# Patient Record
Sex: MALE | Race: WHITE | Employment: FULL TIME | URBAN - METROPOLITAN AREA
[De-identification: names, ages, dates, MRNs, and addresses within clinical notes are randomized per-mention and may not be internally consistent; named-entity substitution may affect disease eponyms.]

---

## 2019-11-11 LAB
HBA1C MFR BLD HPLC: 8.7 %
LDL-C, EXTERNAL: 64

## 2020-08-05 ENCOUNTER — TELEPHONE (OUTPATIENT)
Dept: FAMILY MEDICINE CLINIC | Age: 50
End: 2020-08-05

## 2020-08-05 DIAGNOSIS — E11.65 TYPE 2 DIABETES MELLITUS WITH HYPERGLYCEMIA, WITHOUT LONG-TERM CURRENT USE OF INSULIN (HCC): Primary | ICD-10-CM

## 2020-08-05 RX ORDER — DULAGLUTIDE 1.5 MG/.5ML
1.5 INJECTION, SOLUTION SUBCUTANEOUS
Qty: 2 ML | Refills: 0 | Status: SHIPPED | OUTPATIENT
Start: 2020-08-05 | End: 2020-08-07 | Stop reason: SDUPTHER

## 2020-08-07 ENCOUNTER — VIRTUAL VISIT (OUTPATIENT)
Dept: FAMILY MEDICINE CLINIC | Age: 50
End: 2020-08-07
Payer: COMMERCIAL

## 2020-08-07 DIAGNOSIS — I10 ESSENTIAL HYPERTENSION: ICD-10-CM

## 2020-08-07 DIAGNOSIS — E55.9 VITAMIN D DEFICIENCY: ICD-10-CM

## 2020-08-07 DIAGNOSIS — E11.65 CONTROLLED TYPE 2 DIABETES MELLITUS WITH HYPERGLYCEMIA, WITHOUT LONG-TERM CURRENT USE OF INSULIN (HCC): Primary | ICD-10-CM

## 2020-08-07 DIAGNOSIS — E11.65 TYPE 2 DIABETES MELLITUS WITH HYPERGLYCEMIA, WITHOUT LONG-TERM CURRENT USE OF INSULIN (HCC): ICD-10-CM

## 2020-08-07 DIAGNOSIS — E78.00 HIGH CHOLESTEROL: ICD-10-CM

## 2020-08-07 PROBLEM — E11.9 DIABETES MELLITUS TYPE 2, CONTROLLED (HCC): Status: ACTIVE | Noted: 2020-08-07

## 2020-08-07 PROCEDURE — 99213 OFFICE O/P EST LOW 20 MIN: CPT | Performed by: NURSE PRACTITIONER

## 2020-08-07 RX ORDER — METFORMIN HYDROCHLORIDE 500 MG/1
TABLET, EXTENDED RELEASE ORAL
Qty: 360 TAB | Refills: 0 | Status: SHIPPED | OUTPATIENT
Start: 2020-08-07 | End: 2020-11-03

## 2020-08-07 RX ORDER — LISINOPRIL 20 MG/1
20 TABLET ORAL DAILY
Qty: 90 TAB | Refills: 0 | Status: SHIPPED | OUTPATIENT
Start: 2020-08-07 | End: 2021-01-13

## 2020-08-07 RX ORDER — ROSUVASTATIN CALCIUM 10 MG/1
10 TABLET, COATED ORAL
Qty: 90 TAB | Refills: 0 | Status: SHIPPED | OUTPATIENT
Start: 2020-08-07 | End: 2020-11-03

## 2020-08-07 RX ORDER — BLOOD SUGAR DIAGNOSTIC
STRIP MISCELLANEOUS
COMMUNITY
Start: 2020-05-13

## 2020-08-07 RX ORDER — LISINOPRIL 20 MG/1
TABLET ORAL
COMMUNITY
Start: 2020-08-05 | End: 2020-08-07 | Stop reason: SDUPTHER

## 2020-08-07 RX ORDER — METFORMIN HYDROCHLORIDE 500 MG/1
TABLET, EXTENDED RELEASE ORAL
COMMUNITY
Start: 2020-05-13 | End: 2020-08-07 | Stop reason: SDUPTHER

## 2020-08-07 RX ORDER — DULAGLUTIDE 1.5 MG/.5ML
1.5 INJECTION, SOLUTION SUBCUTANEOUS
Qty: 6 ML | Refills: 0 | Status: SHIPPED | OUTPATIENT
Start: 2020-08-07 | End: 2020-08-31

## 2020-08-07 RX ORDER — LANCETS
EACH MISCELLANEOUS
COMMUNITY
Start: 2020-05-13

## 2020-08-07 RX ORDER — ACETAMINOPHEN 500 MG
2000 TABLET ORAL 2 TIMES DAILY
Qty: 90 CAP | Refills: 3 | Status: SHIPPED | OUTPATIENT
Start: 2020-08-07 | End: 2021-01-03

## 2020-08-07 RX ORDER — ROSUVASTATIN CALCIUM 10 MG/1
TABLET, COATED ORAL
COMMUNITY
Start: 2020-05-11 | End: 2020-08-07 | Stop reason: SDUPTHER

## 2020-08-07 NOTE — PROGRESS NOTES
Consent: Dina Loera, who was seen by synchronous (real-time) audio-video technology, and/or his healthcare decision maker, is aware that this patient-initiated, Telehealth encounter on 8/7/2020 is a billable service, with coverage as determined by his insurance carrier. He is aware that he may receive a bill and has provided verbal consent to proceed: YES-Consent obtained within past 12 months        712  Subjective:   Dina Loera is a 53 y.o. male who was seen for Diabetes  Patient presents for management of type 2 diabetes and HTN. He was last seen 2/12/2020, A1c at the time of visit was 9.3%. Medications reviewed, taking as prescribed with no known side effects. Checks BP readings once a month, reported home BP readings 130-132/80-84. Denies CP, SOB, palpitations, headedness, and dizziness. Does not exercise regularly, stays hydrated, and limits salt. Motivated to start exercise and lose weight when doctorate classes are completed. Discussing weight loss surgery with Dr. Marilee Byrd. Does not check home glucose readings. Ran out of Trulicity for one month and recently restarted med. Declines A1c check until he has been on Trulicity for 3 months. Reports 7-8 pound weight loss since February. Avoids sugar and does not limit carbs. Denies hypoglycemia, frequent urination, thirst, and hunger. Does not have annual exam, denies blurred vision. Checks feet regularly, denies numbness and calluses. Prior to Admission medications    Medication Sig Start Date End Date Taking? Authorizing Provider   dulaglutide (Trulicity) 1.5 LY/3.9 mL sub-q pen 0.5 mL by SubCUTAneous route every seven (7) days. 8/7/20  Yes Wilber Loop, NP   lisinopriL (PRINIVIL, ZESTRIL) 20 mg tablet Take 1 Tab by mouth daily. 8/7/20  Yes Wilber Loop, NP   metFORMIN ER (GLUCOPHAGE XR) 500 mg tablet Take 2 tablets twice daily with meals. 8/7/20  Yes Wilber Loop, NP   rosuvastatin (CRESTOR) 10 mg tablet Take 1 Tab by mouth nightly. 8/7/20  Yes Milo Rm NP   cholecalciferol (VITAMIN D3) (2,000 UNITS /50 MCG) cap capsule Take 2,000 Units by mouth two (2) times a day. 8/7/20  Yes Milo Rm NP   OneTouch UltraSoft Lancets misc  5/13/20   Provider, Historical   OneTouch Verio test strips strip  5/13/20   Provider, Historical   lisinopriL (PRINIVIL, ZESTRIL) 20 mg tablet  8/5/20 8/7/20  Provider, Historical   metFORMIN ER (GLUCOPHAGE XR) 500 mg tablet  5/13/20 8/7/20  Provider, Historical   rosuvastatin (CRESTOR) 10 mg tablet  5/11/20 8/7/20  Provider, Historical   dulaglutide (Trulicity) 1.5 KH/0.8 mL sub-q pen 0.5 mL by SubCUTAneous route every seven (7) days. 8/5/20 8/7/20  Milo Rm NP     No Known Allergies  Patient Active Problem List    Diagnosis    Diabetes mellitus type 2, controlled ([facility] [address]    High cholesterol    Hypertension    Vitamin D deficiency    Morbid obesity ([facility] [address]         ROS  See HPI for pertinent ROS. Objective:   Vital Signs: (As obtained by patient/caregiver at home)  There were no vitals taken for this visit.      [INSTRUCTIONS:  \"[x]\" Indicates a positive item  \"[]\" Indicates a negative item  -- DELETE ALL ITEMS NOT EXAMINED]    Constitutional: [x] Appears well-developed and well-nourished [x] No apparent distress      [] Abnormal -     Mental status: [x] Alert and awake  [x] Oriented to person/place/time [x] Able to follow commands    [] Abnormal -     Eyes:   EOM    [x]  Normal    [] Abnormal -   Sclera  [x]  Normal    [] Abnormal -          Discharge [x]  None visible   [] Abnormal -     HENT: [x] Normocephalic, atraumatic  [] Abnormal -   [x] Mouth/Throat: Mucous membranes are moist    External Ears [x] Normal  [] Abnormal -    Neck: [x] No visualized mass [] Abnormal -     Pulmonary/Chest: [x] Respiratory effort normal   [x] No visualized signs of difficulty breathing or respiratory distress        [] Abnormal -        Neurological:        [x] No Facial Asymmetry (Cranial nerve 7 motor function) (limited exam due to video visit)          [x] No gaze palsy        [] Abnormal -          Skin:        [x] No significant exanthematous lesions or discoloration noted on facial skin         [] Abnormal -            Psychiatric:       [x] Normal Affect [] Abnormal -        [x] No Hallucinations    Other pertinent observable physical exam findings:-              Assessment & Plan:   Diagnoses and all orders for this visit:    1. Controlled type 2 diabetes mellitus with hyperglycemia, without long-term current use of insulin ([facility])  Declines A1c check at this time since he has been out of Trulicity for a month. I am agreeable to this but stressed the importance of home glucose monitoring with goal of less than 125 fasting and 180 postprandial.  Praised for motivation to lose weight which will have a positive impact on diabetes. -     dulaglutide (Trulicity) 1.5 QC/7.9 mL sub-q pen; 0.5 mL by SubCUTAneous route every seven (7) days. -     metFORMIN ER (GLUCOPHAGE XR) 500 mg tablet; Take 2 tablets twice daily with meals. 2. Essential hypertension  Reported home BP readings are below goal.  No medication changes. Continue to check readings regularly and call if consistently greater than 140/90 on either number.  -     lisinopriL (PRINIVIL, ZESTRIL) 20 mg tablet; Take 1 Tab by mouth daily. 3. Type 2 diabetes mellitus with hyperglycemia, without long-term current use of insulin ([facility]  Duplicate diagnosis addressed above. -     dulaglutide (Trulicity) 1.5 DH/4.3 mL sub-q pen; 0.5 mL by SubCUTAneous route every seven (7) days. 4. High cholesterol  Medication refilled as requested. Due for annual labs 11/20/2020.  -     rosuvastatin (CRESTOR) 10 mg tablet; Take 1 Tab by mouth nightly. 5. Vitamin D deficiency  Medication refilled as requested. Due for annual labs 11/20/2020.  -     cholecalciferol (VITAMIN D3) (2,000 UNITS /50 MCG) cap capsule;  Take 2,000 Units by mouth two (2) times a day.          Follow-up and Dispositions    · Return in about 3 months (around 11/7/2020) for wellness, fasting labs, follow up, T2DM, hypertension, lipids, weight. We discussed the expected course, resolution and complications of the diagnosis(es) in detail. Medication risks, benefits, costs, interactions, and alternatives were discussed as indicated. I advised him to contact the office if his condition worsens, changes or fails to improve as anticipated. He expressed understanding with the diagnosis(es) and plan. Ena Ruvalcaba is a 53 y.o. male being evaluated by a video visit encounter for concerns as above. A caregiver was present when appropriate. Due to this being a TeleHealth encounter (During [place] [id number] public [facility] emergency), evaluation of the following organ systems was limited: Vitals/Constitutional/EENT/Resp/CV/GI//MS/Neuro/Skin/Heme-Lymph-Imm. Pursuant to the emergency declaration under the [facility], [facility] waiver authority and the [employer] Act, this Virtual  Visit was conducted, with patient's (and/or legal guardian's) consent, to reduce the patient's risk of exposure to COVID-19 and provide necessary medical care. Services were provided through a video synchronous discussion virtually to substitute for in-person clinic visit. Patient and provider were located at their individual homes.         Mitali Wilkinson NP

## 2020-08-07 NOTE — PATIENT INSTRUCTIONS
Learning About Diabetes and Exercise Can you exercise if you have diabetes? When you have diabetes, it's important to get regular exercise. This helps control your blood sugar level. You can still play sports, run, ride a bike, go swimming, and do other activities when you have diabetes. How can exercise help you manage diabetes? Your body turns the food you eat into glucose, a type of sugar. You need this sugar for energy. When you have diabetes, the sugar builds up in your blood. But when you exercise, your body uses sugar. This helps keep it from building up in your blood and results in lower blood sugar and better control of diabetes. Exercise may help you in other ways too. It can help you reach and stay at a healthy weight. It also helps improve blood pressure and cholesterol, which can reduce the risk of heart disease. Exercise can make you feel stronger and happier. It can help you relax and sleep better, and give you confidence in other things you do. How can you exercise safely? Before you start a new exercise program, talk to your doctor about how and when to exercise. You may need to have a medical exam and tests before you begin. Some types of exercise can be harmful if your diabetes is causing other problems, such as problems with your feet. Your doctor can tell you what types of exercise are good choices for you. These tips can help you exercise safely when you have diabetes. If your diabetes is controlled by diet or medicine that doesn't lower your blood sugar, you don't need to eat a snack before you exercise. · Check your blood sugar before you exercise. And be careful about what you eat. ? If your blood sugar is less than 100, eat a carbohydrate snack before you exercise. ? Be careful when you exercise if your blood sugar is over 300. High blood sugar can make you dehydrated. And that makes your blood sugar levels go even higher. If you have ketones in your blood or urine and your blood sugar is over 300, do not exercise. · Don't try to do too much at first. Build up your exercise program bit by bit. Try to get at least 30 minutes of exercise on most days of the week. Walking is a good choice. You also may want to do other activities, such as riding a bike or swimming. You might try running or gardening. Try to include muscle-strengthening exercises at least 2 times a week. These exercises include push-ups and weight training. You can also use rubber tubing or stretch bands. You stretch or pull the tubing or band to build muscle strength. If you want to exercise more, slowly increase how hard or long you exercise. · You may get symptoms of low blood sugar during exercise or up to 24 hours later. Some symptoms of low blood sugar, such as sweating, a fast heartbeat, or feeling tired, can be confused with what can happen anytime you exercise. Other symptoms may include feeling anxious, dizzy, weak, or shaky. So it's a good idea to check your blood sugar again. · You can treat low blood sugar by eating or drinking something that has 15 grams of carbohydrate. These should be quick-sugar foods. Quick-sugar foods such as glucose tablets, table sugar, honey, fruit juice, regular (not diet) soda pop, or hard candy can help raise blood sugar. Check your blood sugar level again 15 minutes after having a quick-sugar food to make sure your level is getting back to your target range. · Drink plenty of water before, during, and after you exercise. · Wear medical alert jewelry that says you have diabetes. You can buy this at most drugstores. · Pay attention to your body. If you are used to exercise and notice that you can't do as much as usual, talk to your doctor. Follow-up care is a key part of your treatment and safety. Be sure to make and go to all appointments, and call your doctor if you are having problems. It's also a good idea to know your test results and keep a list of the medicines you take. Where can you learn more? Go to http://tesha-clive.info/ Enter E371 in the search box to learn more about \"Learning About Diabetes and Exercise. \" Current as of: December 20, 2019               Content Version: 12.5 © 8923-8205 [employer]. Care instructions adapted under license by [employer] (which disclaims liability or warranty for this information). If you have questions about a medical condition or this instruction, always ask your healthcare professional. [address] any warranty or liability for your use of this information. Learning About Diabetes Food Guidelines Your Care Instructions Meal planning is important to manage diabetes. It helps keep your blood sugar at a target level (which you set with your doctor). You don't have to eat special foods. You can eat what your family eats, including sweets once in a while. But you do have to pay attention to how often you eat and how much you eat of certain foods. You may want to work with a dietitian or a certified diabetes educator (CDE) to help you plan meals and snacks. A dietitian or CDE can also help you lose weight if that is one of your goals. What should you know about eating carbs? Managing the amount of carbohydrate (carbs) you eat is an important part of healthy meals when you have diabetes. Carbohydrate is found in many foods. · Learn which foods have carbs. And learn the amounts of carbs in different foods. ? Bread, cereal, pasta, and rice have about 15 grams of carbs in a serving. A serving is 1 slice of bread (1 ounce), ½ cup of cooked cereal, or 1/3 cup of cooked pasta or rice. ? Fruits have 15 grams of carbs in a serving. A serving is 1 small fresh fruit, such as an apple or orange; ½ of a banana; ½ cup of cooked or canned fruit; ½ cup of fruit juice; 1 cup of melon or raspberries; or 2 tablespoons of dried fruit. ? Milk and no-sugar-added yogurt have 15 grams of carbs in a serving. A serving is 1 cup of milk or 2/3 cup of no-sugar-added yogurt. ? Starchy vegetables have 15 grams of carbs in a serving. A serving is ½ cup of mashed potatoes or sweet potato; 1 cup winter squash; ½ of a small baked potato; ½ cup of cooked beans; or ½ cup cooked corn or green peas. · Learn how much carbs to eat each day and at each meal. A dietitian or CDE can teach you how to keep track of the amount of carbs you eat. This is called carbohydrate counting. · If you are not sure how to count carbohydrate grams, use the Plate Method to plan meals. It is a good, quick way to make sure that you have a balanced meal. It also helps you spread carbs throughout the day. ? Divide your plate by types of foods. Put non-starchy vegetables on half the plate, meat or other protein food on one-quarter of the plate, and a grain or starchy vegetable in the final quarter of the plate. To this you can add a small piece of fruit and 1 cup of milk or yogurt, depending on how many carbs you are supposed to eat at a meal. 
· Try to eat about the same amount of carbs at each meal. Do not \"save up\" your daily allowance of carbs to eat at one meal. 
· Proteins have very little or no carbs per serving. Examples of proteins are beef, chicken, turkey, fish, eggs, tofu, cheese, cottage cheese, and peanut butter. A serving size of meat is 3 ounces, which is about the size of a deck of cards. Examples of meat substitute serving sizes (equal to 1 ounce of meat) are 1/4 cup of cottage cheese, 1 egg, 1 tablespoon of peanut butter, and ½ cup of tofu. How can you eat out and still eat healthy? · Learn to estimate the serving sizes of foods that have carbohydrate. If you measure food at home, it will be easier to estimate the amount in a serving of restaurant food. · If the meal you order has too much carbohydrate (such as potatoes, corn, or baked beans), ask to have a low-carbohydrate food instead. Ask for a salad or green vegetables. · If you use insulin, check your blood sugar before and after eating out to help you plan how much to eat in the future. · If you eat more carbohydrate at a meal than you had planned, take a walk or do other exercise. This will help lower your blood sugar. What else should you know? · Limit saturated fat, such as the fat from meat and dairy products. This is a healthy choice because people who have diabetes are at higher risk of heart disease. So choose lean cuts of meat and nonfat or low-fat dairy products. Use olive or canola oil instead of butter or shortening when cooking. · Don't skip meals. Your blood sugar may drop too low if you skip meals and take insulin or certain medicines for diabetes. · Check with your doctor before you drink alcohol. Alcohol can cause your blood sugar to drop too low. Alcohol can also cause a bad reaction if you take certain diabetes medicines. Follow-up care is a key part of your treatment and safety. Be sure to make and go to all appointments, and call your doctor if you are having problems. It's also a good idea to know your test results and keep a list of the medicines you take. Where can you learn more? Go to http://tesha-clive.info/ Enter Q452 in the search box to learn more about \"Learning About Diabetes Food Guidelines. \" Current as of: December 20, 2019               Content Version: 12.5 © 2136-9061 [employer]. Care instructions adapted under license by [employer] (which disclaims liability or warranty for this information). If you have questions about a medical condition or this instruction, always ask your healthcare professional. [address] any warranty or liability for your use of this information. Learning About High Blood Pressure What is high blood pressure? Blood pressure is a measure of how hard the blood pushes against the walls of your arteries. It's normal for blood pressure to go up and down throughout the day. But if it stays up, you have high blood pressure. Another name for high blood pressure is hypertension. Two numbers tell you your blood pressure. The first number is the systolic pressure (top number). It shows how hard the blood pushes when your heart is pumping. The second number is the diastolic pressure (bottom number). It shows how hard the blood pushes between heartbeats, when your heart is relaxed and filling with blood. Your doctor will give you a goal for your blood pressure based on your health and your age. High blood pressure (hypertension) means that the top number stays high, or the bottom number stays high, or both. High blood pressure increases the risk of stroke, heart attack, and other problems. What happens when you have high blood pressure? · Blood flows through your arteries with too much force. Over time, this damages the walls of your arteries. But you can't feel it. High blood pressure usually doesn't cause symptoms. · Fat and calcium start to build up in your arteries. This buildup is called plaque. Plaque makes your arteries narrower and stiffer. Blood can't flow through them as easily. · This lack of good blood flow starts to damage some of the organs in your body. This can lead to problems such as coronary artery disease and heart attack, heart failure, stroke, kidney failure, and eye damage. How can you prevent high blood pressure? · Stay at a healthy weight. · Try to limit how much sodium you eat to less than 2,300 milligrams (mg) a day. If you limit your sodium to 1,500 mg a day, you can lower your blood pressure even more. ? Buy foods that are labeled \"unsalted,\" \"sodium-free,\" or \"low-sodium. \" Foods labeled \"reduced-sodium\" and \"light sodium\" may still have too much sodium. ? Flavor your food with garlic, lemon juice, onion, vinegar, herbs, and spices instead of salt. Do not use soy sauce, steak sauce, onion salt, garlic salt, mustard, or ketchup on your food. ? Use less salt (or none) when recipes call for it. You can often use half the salt a recipe calls for without losing flavor. · Be physically active. Get at least 30 minutes of exercise on most days of the week. Walking is a good choice. You also may want to do other activities, such as running, swimming, cycling, or playing tennis or team sports. · Limit alcohol to 2 drinks a day for men and 1 drink a day for women. · Eat plenty of fruits, vegetables, and low-fat dairy products. Eat less saturated and total fats. How is high blood pressure treated? · Your doctor will suggest making lifestyle changes to help your heart. For example, your doctor may ask you to eat healthy foods, quit smoking, lose extra weight, and be more active. · If lifestyle changes don't help enough, your doctor may recommend that you take medicine. · When blood pressure is very high, medicines are needed to lower it. Follow-up care is a key part of your treatment and safety. Be sure to make and go to all appointments, and call your doctor if you are having problems. It's also a good idea to know your test results and keep a list of the medicines you take. Where can you learn more? Go to http://tesha-clive.info/ Enter P501 in the search box to learn more about \"Learning About High Blood Pressure. \" 
 Current as of: December 16, 2019               Content Version: 12.5 © 8371-3206 [employer]. Care instructions adapted under license by [employer] (which disclaims liability or warranty for this information). If you have questions about a medical condition or this instruction, always ask your healthcare professional. [address] any warranty or liability for your use of this information.

## 2020-09-09 VITALS
SYSTOLIC BLOOD PRESSURE: 120 MMHG | HEIGHT: 74 IN | BODY MASS INDEX: 40.43 KG/M2 | HEART RATE: 94 BPM | DIASTOLIC BLOOD PRESSURE: 78 MMHG | OXYGEN SATURATION: 95 % | WEIGHT: 315 LBS | TEMPERATURE: 97.6 F

## 2020-09-09 PROBLEM — E78.5 HYPERLIPIDEMIA: Status: ACTIVE | Noted: 2020-08-07

## 2020-09-09 RX ORDER — CHOLECALCIFEROL (VITAMIN D3) 50 MCG
CAPSULE ORAL
COMMUNITY

## 2020-10-31 DIAGNOSIS — E11.65 CONTROLLED TYPE 2 DIABETES MELLITUS WITH HYPERGLYCEMIA, WITHOUT LONG-TERM CURRENT USE OF INSULIN (HCC): ICD-10-CM

## 2020-10-31 DIAGNOSIS — E78.00 HIGH CHOLESTEROL: ICD-10-CM

## 2020-11-03 RX ORDER — METFORMIN HYDROCHLORIDE 500 MG/1
TABLET, EXTENDED RELEASE ORAL
Qty: 360 TAB | Refills: 0 | Status: SHIPPED | OUTPATIENT
Start: 2020-11-03 | End: 2021-01-28

## 2020-11-03 RX ORDER — ROSUVASTATIN CALCIUM 10 MG/1
TABLET, COATED ORAL
Qty: 90 TAB | Refills: 0 | Status: SHIPPED | OUTPATIENT
Start: 2020-11-03 | End: 2021-01-28

## 2020-11-12 ENCOUNTER — OFFICE VISIT (OUTPATIENT)
Dept: FAMILY MEDICINE CLINIC | Age: 50
End: 2020-11-12
Payer: COMMERCIAL

## 2020-11-12 VITALS
TEMPERATURE: 96 F | SYSTOLIC BLOOD PRESSURE: 132 MMHG | WEIGHT: 315 LBS | BODY MASS INDEX: 40.43 KG/M2 | DIASTOLIC BLOOD PRESSURE: 82 MMHG | HEART RATE: 81 BPM | HEIGHT: 74 IN | OXYGEN SATURATION: 96 %

## 2020-11-12 DIAGNOSIS — I10 ESSENTIAL HYPERTENSION: Primary | ICD-10-CM

## 2020-11-12 DIAGNOSIS — E78.00 PURE HYPERCHOLESTEROLEMIA: ICD-10-CM

## 2020-11-12 DIAGNOSIS — E11.65 CONTROLLED TYPE 2 DIABETES MELLITUS WITH HYPERGLYCEMIA, WITHOUT LONG-TERM CURRENT USE OF INSULIN (HCC): ICD-10-CM

## 2020-11-12 DIAGNOSIS — Z28.21 INFLUENZA VACCINE REFUSED: ICD-10-CM

## 2020-11-12 PROCEDURE — 99214 OFFICE O/P EST MOD 30 MIN: CPT | Performed by: NURSE PRACTITIONER

## 2020-11-12 RX ORDER — BISMUTH SUBSALICYLATE 262 MG
1 TABLET,CHEWABLE ORAL DAILY
COMMUNITY

## 2020-11-12 NOTE — PATIENT INSTRUCTIONS
Learning About Diabetes and Exercise Can you exercise if you have diabetes? When you have diabetes, it's important to get regular exercise. This helps control your blood sugar level. You can still play sports, run, ride a bike, go swimming, and do other activities when you have diabetes. How can exercise help you manage diabetes? Your body turns the food you eat into glucose, a type of sugar. You need this sugar for energy. When you have diabetes, the sugar builds up in your blood. But when you exercise, your body uses sugar. This helps keep it from building up in your blood and results in lower blood sugar and better control of diabetes. Exercise may help you in other ways too. It can help you reach and stay at a healthy weight. It also helps improve blood pressure and cholesterol, which can reduce the risk of heart disease. Exercise can make you feel stronger and happier. It can help you relax and sleep better, and give you confidence in other things you do. How can you exercise safely? Before you start a new exercise program, talk to your doctor about how and when to exercise. You may need to have a medical exam and tests before you begin. Some types of exercise can be harmful if your diabetes is causing other problems, such as problems with your feet. Your doctor can tell you what types of exercise are good choices for you. These tips can help you exercise safely when you have diabetes. If your diabetes is controlled by diet or medicine that doesn't lower your blood sugar, you don't need to eat a snack before you exercise. · Check your blood sugar before you exercise. And be careful about what you eat. ? If your blood sugar is less than 100, eat a carbohydrate snack before you exercise. ? Be careful when you exercise if your blood sugar is over 300. High blood sugar can make you dehydrated. And that makes your blood sugar levels go even higher. If you have ketones in your blood or urine and your blood sugar is over 300, do not exercise. · Don't try to do too much at first. Build up your exercise program bit by bit. Try to get at least 30 minutes of exercise on most days of the week. Walking is a good choice. You also may want to do other activities, such as riding a bike or swimming. You might try running or gardening. Try to include muscle-strengthening exercises at least 2 times a week. These exercises include push-ups and weight training. You can also use rubber tubing or stretch bands. You stretch or pull the tubing or band to build muscle strength. If you want to exercise more, slowly increase how hard or long you exercise. · You may get symptoms of low blood sugar during exercise or up to 24 hours later. Some symptoms of low blood sugar, such as sweating, a fast heartbeat, or feeling tired, can be confused with what can happen anytime you exercise. Other symptoms may include feeling anxious, dizzy, weak, or shaky. So it's a good idea to check your blood sugar again. · You can treat low blood sugar by eating or drinking something that has 15 grams of carbohydrate. These should be quick-sugar foods. Quick-sugar foods such as glucose tablets, table sugar, honey, fruit juice, regular (not diet) soda pop, or hard candy can help raise blood sugar. Check your blood sugar level again 15 minutes after having a quick-sugar food to make sure your level is getting back to your target range. · Drink plenty of water before, during, and after you exercise. · Wear medical alert jewelry that says you have diabetes. You can buy this at most drugstores. · Pay attention to your body. If you are used to exercise and notice that you can't do as much as usual, talk to your doctor. Follow-up care is a key part of your treatment and safety. Be sure to make and go to all appointments, and call your doctor if you are having problems. It's also a good idea to know your test results and keep a list of the medicines you take. Where can you learn more? Go to http://www.gray.com/ Enter R539 in the search box to learn more about \"Learning About Diabetes and Exercise. \" Current as of: December 20, 2019               Content Version: 12.6 © 1557-4953 [employer] Care instructions adapted under license by [employer] (which disclaims liability or warranty for this information). If you have questions about a medical condition or this instruction, always ask your healthcare professional. [address] any warranty or liability for your use of this information. Learning About Diabetes Food Guidelines Your Care Instructions Meal planning is important to manage diabetes. It helps keep your blood sugar at a target level (which you set with your doctor). You don't have to eat special foods. You can eat what your family eats, including sweets once in a while. But you do have to pay attention to how often you eat and how much you eat of certain foods. You may want to work with a dietitian or a certified diabetes educator (CDE) to help you plan meals and snacks. A dietitian or CDE can also help you lose weight if that is one of your goals. What should you know about eating carbs? Managing the amount of carbohydrate (carbs) you eat is an important part of healthy meals when you have diabetes. Carbohydrate is found in many foods. · Learn which foods have carbs. And learn the amounts of carbs in different foods. ? Bread, cereal, pasta, and rice have about 15 grams of carbs in a serving. A serving is 1 slice of bread (1 ounce), ½ cup of cooked cereal, or 1/3 cup of cooked pasta or rice. ? Fruits have 15 grams of carbs in a serving. A serving is 1 small fresh fruit, such as an apple or orange; ½ of a banana; ½ cup of cooked or canned fruit; ½ cup of fruit juice; 1 cup of melon or raspberries; or 2 tablespoons of dried fruit. ? Milk and no-sugar-added yogurt have 15 grams of carbs in a serving. A serving is 1 cup of milk or 2/3 cup of no-sugar-added yogurt. ? Starchy vegetables have 15 grams of carbs in a serving. A serving is ½ cup of mashed potatoes or sweet potato; 1 cup winter squash; ½ of a small baked potato; ½ cup of cooked beans; or ½ cup cooked corn or green peas. · Learn how much carbs to eat each day and at each meal. A dietitian or CDE can teach you how to keep track of the amount of carbs you eat. This is called carbohydrate counting. · If you are not sure how to count carbohydrate grams, use the Plate Method to plan meals. It is a good, quick way to make sure that you have a balanced meal. It also helps you spread carbs throughout the day. ? Divide your plate by types of foods. Put non-starchy vegetables on half the plate, meat or other protein food on one-quarter of the plate, and a grain or starchy vegetable in the final quarter of the plate. To this you can add a small piece of fruit and 1 cup of milk or yogurt, depending on how many carbs you are supposed to eat at a meal. 
· Try to eat about the same amount of carbs at each meal. Do not \"save up\" your daily allowance of carbs to eat at one meal. 
· Proteins have very little or no carbs per serving. Examples of proteins are beef, chicken, turkey, fish, eggs, tofu, cheese, cottage cheese, and peanut butter. A serving size of meat is 3 ounces, which is about the size of a deck of cards. Examples of meat substitute serving sizes (equal to 1 ounce of meat) are 1/4 cup of cottage cheese, 1 egg, 1 tablespoon of peanut butter, and ½ cup of tofu. How can you eat out and still eat healthy? · Learn to estimate the serving sizes of foods that have carbohydrate. If you measure food at home, it will be easier to estimate the amount in a serving of restaurant food. · If the meal you order has too much carbohydrate (such as potatoes, corn, or baked beans), ask to have a low-carbohydrate food instead. Ask for a salad or green vegetables. · If you use insulin, check your blood sugar before and after eating out to help you plan how much to eat in the future. · If you eat more carbohydrate at a meal than you had planned, take a walk or do other exercise. This will help lower your blood sugar. What else should you know? · Limit saturated fat, such as the fat from meat and dairy products. This is a healthy choice because people who have diabetes are at higher risk of heart disease. So choose lean cuts of meat and nonfat or low-fat dairy products. Use olive or canola oil instead of butter or shortening when cooking. · Don't skip meals. Your blood sugar may drop too low if you skip meals and take insulin or certain medicines for diabetes. · Check with your doctor before you drink alcohol. Alcohol can cause your blood sugar to drop too low. Alcohol can also cause a bad reaction if you take certain diabetes medicines. Follow-up care is a key part of your treatment and safety. Be sure to make and go to all appointments, and call your doctor if you are having problems. It's also a good idea to know your test results and keep a list of the medicines you take. Where can you learn more? Go to http://www.gray.com/ Enter G207 in the search box to learn more about \"Learning About Diabetes Food Guidelines. \" Current as of: December 20, 2019               Content Version: 12.6 © 9695-3550 [employer]. Care instructions adapted under license by [employer] (which disclaims liability or warranty for this information). If you have questions about a medical condition or this instruction, always ask your healthcare professional. [address] any warranty or liability for your use of this information.

## 2020-11-12 NOTE — PROGRESS NOTES
Subjective  Chief Complaint   Patient presents with    Follow-up     HTN, DM     HPI:  Kary Engle is a 53 y.o. male. Patient presents for management of hypertension, hyperlipidemia, and type 2 diabetes. A1c 9.3% when last checked 2/12/2020. Medications reviewed, taking as prescribed with no known side effects. Hypertension- reported home BP readings 130s/80s. Limits salt and stays hydrated. Hyperlipidemia- statin daily in the evening. Limits fat/cholesterol. Type 2 diabetes- has not been checking home glucose readings. Has new machine but pharmacy filled wrong strips/lancets. Denies visual changes, eye exam overdue due to [place]. Checks feet daily, denies injury, reports mild numbness that is not worsening. Denies hypoglycemia. Denies increased thirst, appetite, and urination. Not compliant with diabetic diet and exercise. Appointment scheduled with Dr. Akua Means in January to discuss bariatric surgery.     Past Medical History:   Diagnosis Date    Diabetes ([facility] [address]     Hypercholesterolemia     Hypertension      Family History   Problem Relation Age of Onset    Diabetes Mother     Hypertension Mother     Breast Cancer Mother     Diabetes Maternal Grandmother     Breast Cancer Maternal Grandmother     Diabetes Paternal Grandfather      Social History     Socioeconomic History    Marital status:      Spouse name: Not on file    Number of children: Not on file    Years of education: Not on file    Highest education level: Not on file   Occupational History    Not on file   Social Needs    Financial resource strain: Not on file    Food insecurity     Worry: Not on file     Inability: Not on file    Transportation needs     Medical: Not on file     Non-medical: Not on file   Tobacco Use    Smoking status: Never Smoker    Smokeless tobacco: Never Used   Substance and Sexual Activity    Alcohol use: Never     Frequency: Never    Drug use: Never    Sexual activity: Yes   Lifestyle    Physical activity     Days per week: Not on file     Minutes per session: Not on file    Stress: Not on file   Relationships    Social connections     Talks on phone: Not on file     Gets together: Not on file     Attends [employer] service: Not on file     Active member of club or organization: Not on file     Attends meetings of clubs or organizations: Not on file     Relationship status: Not on file    Intimate partner violence     Fear of current or ex partner: Not on file     Emotionally abused: Not on file     Physically abused: Not on file     Forced sexual activity: Not on file   Other Topics Concern    Not on file   Social History Narrative    Not on file     Current Outpatient Medications on File Prior to Visit   Medication Sig Dispense Refill    multivitamin (ONE A DAY) tablet Take 1 Tab by mouth daily.  metFORMIN ER (GLUCOPHAGE XR) 500 mg tablet TAKE 2 TABLETS BY MOUTH TWICE DAILY WITH MEALS 360 Tab 0    rosuvastatin (CRESTOR) 10 mg tablet TAKE 1 TABLET BY MOUTH EVERY DAY AT NIGHT 90 Tab 0    BABY ASPIRIN PO Take  by mouth.  omega 3-dha-epa-fish oil (Fish Oil) 100-160-1,000 mg cap Take  by mouth.  Trulicity 1.5 TH/0.2 mL sub-q pen 0.5 ML BY SUBCUTANEOUS ROUTE EVERY SEVEN (7) DAYS. 2 Syringe 2    OneTouch UltraSoft Lancets misc       OneTouch Verio test strips strip       lisinopriL (PRINIVIL, ZESTRIL) 20 mg tablet Take 1 Tab by mouth daily. 90 Tab 0    cholecalciferol (VITAMIN D3) (2,000 UNITS /50 MCG) cap capsule Take 2,000 Units by mouth two (2) times a day. 90 Cap 3     No current facility-administered medications on file prior to visit. No Known Allergies  ROS  See HPI for pertinent ROS. Objective  Physical Exam  Vitals signs and nursing note reviewed. Constitutional:       General: He is not in acute distress. Appearance: Normal appearance. He is morbidly obese. HENT:      Head: Normocephalic. Eyes:      Extraocular Movements: Extraocular movements intact. Cardiovascular:      Rate and Rhythm: Normal rate and regular rhythm. Heart sounds: Normal heart sounds. Pulmonary:      Effort: Pulmonary effort is normal.      Breath sounds: Normal breath sounds. Musculoskeletal: Normal range of motion. Right lower leg: No edema. Left lower leg: No edema. Skin:     General: Skin is warm and dry. Neurological:      Mental Status: He is alert and oriented to person, place, and time. Psychiatric:         Mood and Affect: Mood normal.         Behavior: Behavior normal.          Assessment & Plan      ICD-10-CM ICD-9-CM    1. Essential hypertension  Z26 218.1 METABOLIC PANEL, COMPREHENSIVE   2. Pure hypercholesterolemia  E78.00 272.0 CBC WITH AUTOMATED DIFF      LIPID PANEL   3. Controlled type 2 diabetes mellitus with hyperglycemia, without long-term current use of insulin ([facility])  E11.65 250.80 HEMOGLOBIN A1C WITH EAG     790.29 MICROALBUMIN, UR, RAND W/ MICROALB/CREAT RATIO   4. Influenza vaccine refused  Z28.21 V64.06      Diagnoses and all orders for this visit:    1. Essential hypertension  BP is below goal in the office today and on reported home readings. No medication changes. Check BP readings 1 to 2 hours after taking medication and after sitting quietly for 5 minutes with both feet flat on the floor and arm at heart level. Call if readings are consistently greater than 140/90 on either number. Increase regular exercise, limit salt intake, and stay well-hydrated. -     METABOLIC PANEL, COMPREHENSIVE    2. Pure hypercholesterolemia  Checking annual labs. Continue to take statin daily in the evening, limit fat/cholesterol in diet, and increase regular exercise. -     CBC WITH AUTOMATED DIFF  -     LIPID PANEL    3. Controlled type 2 diabetes mellitus with hyperglycemia, without long-term current use of insulin ([facility])  Checking A1c today before adjusting meds. Reinforced the importance of compliance with diabetic exercise and diet recommendations. Continue to take lisinopril daily for kidney protection and simvastatin and baby aspirin to decrease cardiovascular risk. Schedule diabetic eye exam.  -     HEMOGLOBIN A1C WITH EAG  -     MICROALBUMIN, UR, RAND W/ MICROALB/CREAT RATIO    4. Influenza vaccine refused      Follow-up and Dispositions    · Return in about 3 months (around 2/12/2021) for wellness, nonfasting, follow up, hypertension, T2DM (on or after 2/11/2021).            Kenny Treviño, NP

## 2020-11-13 LAB
ALBUMIN SERPL-MCNC: 4.5 G/DL (ref 4–5)
ALBUMIN/CREAT UR: 4 MG/G CREAT (ref 0–29)
ALBUMIN/GLOB SERPL: 1.7 {RATIO} (ref 1.2–2.2)
ALP SERPL-CCNC: 75 IU/L (ref 39–117)
ALT SERPL-CCNC: 31 IU/L (ref 0–44)
AST SERPL-CCNC: 27 IU/L (ref 0–40)
BASOPHILS # BLD AUTO: 0.1 X10E3/UL (ref 0–0.2)
BASOPHILS NFR BLD AUTO: 1 %
BILIRUB SERPL-MCNC: 0.4 MG/DL (ref 0–1.2)
BUN SERPL-MCNC: 22 MG/DL (ref 6–24)
BUN/CREAT SERPL: 23 (ref 9–20)
CALCIUM SERPL-MCNC: 9.4 MG/DL (ref 8.7–10.2)
CHLORIDE SERPL-SCNC: 101 MMOL/L (ref 96–106)
CHOLEST SERPL-MCNC: 123 MG/DL (ref 100–199)
CO2 SERPL-SCNC: 21 MMOL/L (ref 20–29)
CREAT SERPL-MCNC: 0.94 MG/DL (ref 0.76–1.27)
CREAT UR-MCNC: 82.2 MG/DL
EOSINOPHIL # BLD AUTO: 0.3 X10E3/UL (ref 0–0.4)
EOSINOPHIL NFR BLD AUTO: 3 %
ERYTHROCYTE [DISTWIDTH] IN BLOOD BY AUTOMATED COUNT: 13.8 % (ref 11.6–15.4)
EST. AVERAGE GLUCOSE BLD GHB EST-MCNC: 200 MG/DL
GLOBULIN SER CALC-MCNC: 2.6 G/DL (ref 1.5–4.5)
GLUCOSE SERPL-MCNC: 243 MG/DL (ref 65–99)
HBA1C MFR BLD: 8.6 % (ref 4.8–5.6)
HCT VFR BLD AUTO: 42.5 % (ref 37.5–51)
HDLC SERPL-MCNC: 36 MG/DL
HGB BLD-MCNC: 13.7 G/DL (ref 13–17.7)
IMM GRANULOCYTES # BLD AUTO: 0.1 X10E3/UL (ref 0–0.1)
IMM GRANULOCYTES NFR BLD AUTO: 1 %
LDLC SERPL CALC-MCNC: 46 MG/DL (ref 0–99)
LYMPHOCYTES # BLD AUTO: 2.1 X10E3/UL (ref 0.7–3.1)
LYMPHOCYTES NFR BLD AUTO: 21 %
MCH RBC QN AUTO: 27.6 PG (ref 26.6–33)
MCHC RBC AUTO-ENTMCNC: 32.2 G/DL (ref 31.5–35.7)
MCV RBC AUTO: 86 FL (ref 79–97)
MICROALBUMIN UR-MCNC: 3.5 UG/ML
MONOCYTES # BLD AUTO: 0.7 X10E3/UL (ref 0.1–0.9)
MONOCYTES NFR BLD AUTO: 7 %
NEUTROPHILS # BLD AUTO: 7.1 X10E3/UL (ref 1.4–7)
NEUTROPHILS NFR BLD AUTO: 67 %
PLATELET # BLD AUTO: 242 X10E3/UL (ref 150–450)
POTASSIUM SERPL-SCNC: 4.8 MMOL/L (ref 3.5–5.2)
PROT SERPL-MCNC: 7.1 G/DL (ref 6–8.5)
RBC # BLD AUTO: 4.96 X10E6/UL (ref 4.14–5.8)
SODIUM SERPL-SCNC: 138 MMOL/L (ref 134–144)
TRIGL SERPL-MCNC: 261 MG/DL (ref 0–149)
VLDLC SERPL CALC-MCNC: 41 MG/DL (ref 5–40)
WBC # BLD AUTO: 10.3 X10E3/UL (ref 3.4–10.8)

## 2021-01-01 DIAGNOSIS — E55.9 VITAMIN D DEFICIENCY: ICD-10-CM

## 2021-01-03 RX ORDER — ACETAMINOPHEN 500 MG
2000 TABLET ORAL 2 TIMES DAILY
Qty: 180 CAP | Refills: 1 | Status: SHIPPED | OUTPATIENT
Start: 2021-01-03 | End: 2021-07-06

## 2021-01-13 DIAGNOSIS — I10 ESSENTIAL HYPERTENSION: ICD-10-CM

## 2021-01-13 RX ORDER — LISINOPRIL 20 MG/1
TABLET ORAL
Qty: 90 TAB | Refills: 0 | Status: SHIPPED | OUTPATIENT
Start: 2021-01-13 | End: 2021-10-25

## 2021-02-19 VITALS
WEIGHT: 315 LBS | BODY MASS INDEX: 40.43 KG/M2 | TEMPERATURE: 97.8 F | HEIGHT: 74 IN | HEART RATE: 78 BPM | SYSTOLIC BLOOD PRESSURE: 130 MMHG | OXYGEN SATURATION: 98 % | DIASTOLIC BLOOD PRESSURE: 84 MMHG

## 2021-02-24 ENCOUNTER — OFFICE VISIT (OUTPATIENT)
Dept: FAMILY MEDICINE CLINIC | Age: 51
End: 2021-02-24
Payer: COMMERCIAL

## 2021-02-24 VITALS
OXYGEN SATURATION: 98 % | TEMPERATURE: 96.6 F | HEIGHT: 74 IN | HEART RATE: 78 BPM | SYSTOLIC BLOOD PRESSURE: 130 MMHG | BODY MASS INDEX: 40.43 KG/M2 | WEIGHT: 315 LBS | DIASTOLIC BLOOD PRESSURE: 80 MMHG

## 2021-02-24 DIAGNOSIS — Z28.20 VACCINE REFUSED BY PATIENT: ICD-10-CM

## 2021-02-24 DIAGNOSIS — E66.01 MORBID OBESITY WITH BODY MASS INDEX OF 50.0-59.9 IN ADULT (HCC): ICD-10-CM

## 2021-02-24 DIAGNOSIS — E78.00 PURE HYPERCHOLESTEROLEMIA: ICD-10-CM

## 2021-02-24 DIAGNOSIS — H83.93 DYSFUNCTION OF BOTH INNER EARS: ICD-10-CM

## 2021-02-24 DIAGNOSIS — Z12.11 COLON CANCER SCREENING: ICD-10-CM

## 2021-02-24 DIAGNOSIS — I83.93 VARICOSE VEINS OF BOTH LOWER EXTREMITIES, UNSPECIFIED WHETHER COMPLICATED: ICD-10-CM

## 2021-02-24 DIAGNOSIS — Z23 ENCOUNTER FOR IMMUNIZATION: ICD-10-CM

## 2021-02-24 DIAGNOSIS — Z79.82 LONG-TERM USE OF ASPIRIN THERAPY: ICD-10-CM

## 2021-02-24 DIAGNOSIS — Z00.00 WELLNESS EXAMINATION: Primary | ICD-10-CM

## 2021-02-24 DIAGNOSIS — I10 ESSENTIAL HYPERTENSION: ICD-10-CM

## 2021-02-24 DIAGNOSIS — E11.65 CONTROLLED TYPE 2 DIABETES MELLITUS WITH HYPERGLYCEMIA, WITHOUT LONG-TERM CURRENT USE OF INSULIN (HCC): ICD-10-CM

## 2021-02-24 PROCEDURE — 99396 PREV VISIT EST AGE 40-64: CPT | Performed by: NURSE PRACTITIONER

## 2021-02-24 PROCEDURE — 99214 OFFICE O/P EST MOD 30 MIN: CPT | Performed by: NURSE PRACTITIONER

## 2021-02-24 NOTE — PATIENT INSTRUCTIONS
Glucose goals are <130 fasting and <180 nonfasting.  
Scheduled diabetic eye exam and dental exam.

## 2021-02-24 NOTE — PROGRESS NOTES
Chief Complaint   Patient presents with   Marcy See Annual Wellness Visit     1. Have you been to the ER, urgent care clinic since your last visit? Hospitalized since your last visit? No    2. Have you seen or consulted any other health care providers outside of the [address] [place] [address] since your last visit? Include any pap smears or colon screening. Yes Patient saw Dr. Cicero Goldberg on 1/11/2021, and a phycologist Dr. Greta Jacob both for Weight lost surgery .  And also saw Scottie Hernandez for Pulmonary for sleep apnea

## 2021-02-24 NOTE — PROGRESS NOTES
Subjective  Chief Complaint   Patient presents with    Annual Wellness Visit     HPI:  Iraj Henley is a 49 y.o. male. Patient presents for wellness, fasting labs, and management of HTN, hyperlipidemia, and T2DM. Understands this is considered two appointments and there may be a copay for the follow up portion of the visit. Has started the process for bariatric surgery with Dr. Zak Smith, lap sleeve gastrectomy. Surgery to be scheduled possibly 1/2022. Presents with labs as requested for Dr. Zak Smith. Also requesting ENT referral for inner ear concerns. Also concerned about circulation to lower extremities. Has noticed changes in his feet and increasing numbness. For wellness:  Immunizations:  Flu: declines  Tetanus: 2018  Shingrix: due  Pneumovax 23: 2019    Colon cancer screening: due  Smoking status: never    Moods: at goal  Diet: as discussed   Exercise: as discussed  Vision exams: overdue  Dental exams: overdue    For chronic disease management:  Medications reviewed, taking as prescribed with no known side effects. Rosuvastatin daily in the evening for hyperlipidemia. Limits fat/cholesterol in diet.     Management of HTN-  Current meds: Lisinopril  Home BP readings: does not check at home, has a cuff, readings at recent medical appointments 130/80  High salt intake: no  Stays hydrated: yes  Regular exercise: not regular, working on a plan with bariatric specialist    Management of T2DM-  Current meds: Metformin and Trulicity  Compliant with medication: yes  Last A1c: 8.6% 11/13/2020  Home glucose monitoring range: does not check, has supplies, prefers not to stick finger  Hypoglycemia: denies  Increased thirst, appetite, and urination: denies  Vision changes, last eye exam: overdue, wife to schedule today  Checks feet regularly, denies numbness and injury: yes, denies  Last foot exam: 11/2020  Compliant with statin: yes  Compliant with ACEI: yes  Last microalbumin: 11/2020  Pneumovax: complete  Compliant with diet: working on improving  Complaint with exercise: as above  Denies personal h/o CV disease including MI, stroke, and PVD: denies           Past Medical History:   Diagnosis Date    Diabetes ([facility] [address]     Hypercholesterolemia     Hypertension      Family History   Problem Relation Age of Onset    Diabetes Mother     Hypertension Mother     Breast Cancer Mother     Diabetes Maternal Grandmother     Breast Cancer Maternal Grandmother     Diabetes Paternal Grandfather      Social History     Socioeconomic History    Marital status:      Spouse name: Not on file    Number of children: Not on file    Years of education: Not on file    Highest education level: Not on file   Occupational History    Not on file   Social Needs    Financial resource strain: Not on file    Food insecurity     Worry: Not on file     Inability: Not on file    Transportation needs     Medical: Not on file     Non-medical: Not on file   Tobacco Use    Smoking status: Never Smoker    Smokeless tobacco: Never Used   Substance and Sexual Activity    Alcohol use: Never     Frequency: Never    Drug use: Never    Sexual activity: Yes   Lifestyle    Physical activity     Days per week: Not on file     Minutes per session: Not on file    Stress: Not on file   Relationships    Social connections     Talks on phone: Not on file     Gets together: Not on file     Attends Mandaeism service: Not on file     Active member of club or organization: Not on file     Attends meetings of clubs or organizations: Not on file     Relationship status: Not on file    Intimate partner violence     Fear of current or ex partner: Not on file     Emotionally abused: Not on file     Physically abused: Not on file     Forced sexual activity: Not on file   Other Topics Concern    Not on file   Social History Narrative    Not on file     Current Outpatient Medications on File Prior to Visit   Medication Sig Dispense Refill  metFORMIN ER (GLUCOPHAGE XR) 500 mg tablet TAKE 2 TABLETS BY MOUTH TWICE DAILY WITH MEALS 360 Tab 0    rosuvastatin (CRESTOR) 10 mg tablet TAKE 1 TABLET BY MOUTH EVERY DAY AT NIGHT 90 Tab 3    Trulicity 1.5 BB/2.4 mL sub-q pen 0.5 ML BY SUBCUTANEOUS ROUTE EVERY SEVEN (7) DAYS. 2 Syringe 2    lisinopriL (PRINIVIL, ZESTRIL) 20 mg tablet TAKE 1 TABLET BY MOUTH EVERY DAY 90 Tab 0    cholecalciferol (VITAMIN D3) (2,000 UNITS /50 MCG) cap capsule TAKE 2,000 UNITS BY MOUTH TWO (2) TIMES A DAY. 180 Cap 1    multivitamin (ONE A DAY) tablet Take 1 Tab by mouth daily.  BABY ASPIRIN PO Take  by mouth. 1 tablet po daily      omega 3-dha-epa-fish oil (Fish Oil) 100-160-1,000 mg cap Take  by mouth. 1 tablet po daily      OneTouch UltraSoft Lancets misc       OneTouch Verio test strips strip        No current facility-administered medications on file prior to visit. No Known Allergies  Review of Systems   Constitutional: Negative for chills, fever and weight loss. HENT: Negative for congestion, ear pain, hearing loss, sinus pain and sore throat. Denies difficulty swallowing. Eyes: Negative for blurred vision. Respiratory: Negative for cough, shortness of breath and wheezing. Cardiovascular: Negative for chest pain, palpitations, claudication and leg swelling. Gastrointestinal: Negative for abdominal pain, constipation, diarrhea and heartburn. Genitourinary: Negative for dysuria. Musculoskeletal: Positive for joint pain. Negative for myalgias. Neurological: Negative for dizziness, tingling, weakness and headaches. Psychiatric/Behavioral: Negative for depression. The patient is not nervous/anxious. Objective  Physical Exam  Vitals signs and nursing note reviewed. Constitutional:       General: He is not in acute distress. Appearance: Normal appearance. He is morbidly obese. HENT:      Head: Normocephalic.       Mouth/Throat:      Pharynx: No posterior oropharyngeal erythema. Eyes:      Extraocular Movements: Extraocular movements intact. Neck:      Musculoskeletal: Normal range of motion and neck supple. Thyroid: No thyroid mass, thyromegaly or thyroid tenderness. Cardiovascular:      Rate and Rhythm: Normal rate and regular rhythm. Pulses:           Dorsalis pedis pulses are 2+ on the right side and 2+ on the left side. Posterior tibial pulses are 2+ on the right side and 2+ on the left side. Heart sounds: Normal heart sounds. Comments: Bilateral lower extremities with inflammed varicose veins to calves and superficial varicose veins without inflammation to bilateral feet. Pulmonary:      Effort: Pulmonary effort is normal.      Breath sounds: Normal breath sounds. Abdominal:      General: Bowel sounds are normal.      Palpations: Abdomen is soft. There is no mass. Tenderness: There is no abdominal tenderness. Comments: Limited by habitus   Musculoskeletal: Normal range of motion. Right lower leg: No edema. Left lower leg: No edema. Lymphadenopathy:      Cervical: No cervical adenopathy. Upper Body:      Right upper body: No supraclavicular adenopathy. Left upper body: No supraclavicular adenopathy. Skin:     General: Skin is warm and dry. Neurological:      General: No focal deficit present. Mental Status: He is alert and oriented to person, place, and time. Psychiatric:         Mood and Affect: Mood normal.         Behavior: Behavior normal.         Thought Content: Thought content normal.         Judgment: Judgment normal.          Assessment & Plan      ICD-10-CM ICD-9-CM    1. Wellness examination  Z00.00 V70.0    2. Encounter for immunization  Z23 V03.89    3.  Morbid obesity with body mass index of 50.0-59.9 in adult ([facility])  E66.01 278.01 HEMOGLOBIN A1C WITH EAG    Z68.43 V85.43 VITAMIN B1, WHOLE BLOOD      VITAMIN A      VITAMIN D, 25 HYDROXY      CBC WITH AUTOMATED DIFF VITAMIN B6      METABOLIC PANEL, COMPREHENSIVE      VITAMIN B12 & FOLATE      IRON PROFILE      TSH 3RD GENERATION      H PYLORI AB, IGM      PREALBUMIN      FERRITIN      PTH INTACT   4. Colon cancer screening  Z12.11 V76.51 REFERRAL TO GASTROENTEROLOGY   5. Vaccine refused by patient  Z28.20 V64.09    6. Essential hypertension  I10 401.9    7. Controlled type 2 diabetes mellitus with hyperglycemia, without long-term current use of insulin (HCC)  E11.65 250.80 HEMOGLOBIN A1C WITH EAG     790.29    8. Pure hypercholesterolemia  E78.00 272.0    9. Dysfunction of both inner ears  H83.93 386.50 REFERRAL TO ENT-OTOLARYNGOLOGY   10. Varicose veins of both lower extremities, unspecified whether complicated  Q67.18 916.1    11. Long-term use of aspirin therapy  Z79.82 V58.66      Diagnoses and all orders for this visit:    1. Wellness examination  We are getting patient up-to-date on preventative measures as listed. 2. Encounter for immunization  Advised to receive Shingrix vaccine from pharmacy. 3. Morbid obesity with body mass index of 50.0-59.9 in adult [facility])  Labs as requested by bariatric surgeon. Paperwork completed as requested. Discussed dietary and exercise recommendations for weight loss. -     HEMOGLOBIN A1C WITH EAG  -     VITAMIN B1, WHOLE BLOOD  -     VITAMIN A  -     VITAMIN D, 25 HYDROXY  -     CBC WITH AUTOMATED DIFF  -     VITAMIN B6  -     METABOLIC PANEL, COMPREHENSIVE  -     VITAMIN B12 & FOLATE  -     IRON PROFILE  -     TSH 3RD GENERATION  -     H PYLORI AB, IGM  -     PREALBUMIN  -     FERRITIN  -     PTH INTACT    4. Colon cancer screening  Due for initial screening.  -     REFERRAL TO GASTROENTEROLOGY    5. Vaccine refused by patient  Declines flu vaccine. 6. Essential hypertension  BP is below goal in the office today and at recent medical appointments. Encouraged to check BP readings weekly.  Check BP readings 1 to 2 hours after taking medication and after sitting quietly for 5 minutes with both feet flat on the floor and arm at heart level. Call if readings are consistently greater than 140/90 on either number. Increase regular exercise, limit salt intake, and stay well-hydrated. 7. Controlled type 2 diabetes mellitus with hyperglycemia, without long-term current use of insulin ([facility])  Checking A1c today before adjusting medication. Reinforced the importance of home glucose monitoring and written glucose goals provided to patient. Reinforced the importance of dietary and exercise compliance to improve glucose levels and prevent diabetic complications. Reminded to schedule diabetic eye exam.  -     HEMOGLOBIN A1C WITH EAG    8. Pure hypercholesterolemia  Total cholesterol 123 and LDL 46 when last checked 11/13/2020. Continue to take statin daily in the evening and limit fat and cholesterol in diet. Increase regular exercise. 9. Dysfunction of both inner ears  ENT referral as requested. -     REFERRAL TO ENT-OTOLARYNGOLOGY    10. Varicose veins of both lower extremities, unspecified whether complicated  Bilateral lower extremities with significant varicose veins. Declines vascular surgery referral at this time but will call when ready to see them. In the meantime, limit salt, stay hydrated, and elevate feet frequently throughout the day. Compression stockings may help as well. 11. Long-term use of aspirin therapy  Reviewed recent guideline changes regarding the use of daily baby aspirin and risks associated with daily use. He prefers to continue to take this medication to decrease cardiovascular risk until after his bariatric surgery. Follow-up and Dispositions    · Return in about 3 months (around 5/24/2021) for follow up, T2DM, nonfasting (early June).            John Paul Grewal NP

## 2021-02-26 ENCOUNTER — TELEPHONE (OUTPATIENT)
Dept: ENT CLINIC | Age: 51
End: 2021-02-26

## 2021-03-03 DIAGNOSIS — E11.65 CONTROLLED TYPE 2 DIABETES MELLITUS WITH HYPERGLYCEMIA, WITHOUT LONG-TERM CURRENT USE OF INSULIN (HCC): Primary | ICD-10-CM

## 2021-03-03 LAB
25(OH)D3+25(OH)D2 SERPL-MCNC: 41.3 NG/ML (ref 30–100)
ALBUMIN SERPL-MCNC: 4.2 G/DL (ref 4–5)
ALBUMIN/GLOB SERPL: 1.7 {RATIO} (ref 1.2–2.2)
ALP SERPL-CCNC: 65 IU/L (ref 39–117)
ALT SERPL-CCNC: 31 IU/L (ref 0–44)
AST SERPL-CCNC: 25 IU/L (ref 0–40)
BASOPHILS # BLD AUTO: 0.1 X10E3/UL (ref 0–0.2)
BASOPHILS NFR BLD AUTO: 1 %
BILIRUB SERPL-MCNC: 0.3 MG/DL (ref 0–1.2)
BUN SERPL-MCNC: 18 MG/DL (ref 6–24)
BUN/CREAT SERPL: 23 (ref 9–20)
CALCIUM SERPL-MCNC: 9.5 MG/DL (ref 8.7–10.2)
CHLORIDE SERPL-SCNC: 102 MMOL/L (ref 96–106)
CO2 SERPL-SCNC: 22 MMOL/L (ref 20–29)
CREAT SERPL-MCNC: 0.79 MG/DL (ref 0.76–1.27)
EOSINOPHIL # BLD AUTO: 0.3 X10E3/UL (ref 0–0.4)
EOSINOPHIL NFR BLD AUTO: 3 %
ERYTHROCYTE [DISTWIDTH] IN BLOOD BY AUTOMATED COUNT: 14 % (ref 11.6–15.4)
EST. AVERAGE GLUCOSE BLD GHB EST-MCNC: 226 MG/DL
FERRITIN SERPL-MCNC: 156 NG/ML (ref 30–400)
FOLATE SERPL-MCNC: 14.6 NG/ML
GLOBULIN SER CALC-MCNC: 2.5 G/DL (ref 1.5–4.5)
GLUCOSE SERPL-MCNC: 251 MG/DL (ref 65–99)
H PYLORI IGM SER-ACNC: <9 UNITS (ref 0–8.9)
HBA1C MFR BLD: 9.5 % (ref 4.8–5.6)
HCT VFR BLD AUTO: 41.9 % (ref 37.5–51)
HGB BLD-MCNC: 13.8 G/DL (ref 13–17.7)
IMM GRANULOCYTES # BLD AUTO: 0.1 X10E3/UL (ref 0–0.1)
IMM GRANULOCYTES NFR BLD AUTO: 1 %
IRON SATN MFR SERPL: 19 % (ref 15–55)
IRON SERPL-MCNC: 72 UG/DL (ref 38–169)
LYMPHOCYTES # BLD AUTO: 1.9 X10E3/UL (ref 0.7–3.1)
LYMPHOCYTES NFR BLD AUTO: 23 %
MCH RBC QN AUTO: 28.6 PG (ref 26.6–33)
MCHC RBC AUTO-ENTMCNC: 32.9 G/DL (ref 31.5–35.7)
MCV RBC AUTO: 87 FL (ref 79–97)
MONOCYTES # BLD AUTO: 0.6 X10E3/UL (ref 0.1–0.9)
MONOCYTES NFR BLD AUTO: 7 %
NEUTROPHILS # BLD AUTO: 5.4 X10E3/UL (ref 1.4–7)
NEUTROPHILS NFR BLD AUTO: 65 %
PLATELET # BLD AUTO: 233 X10E3/UL (ref 150–450)
POTASSIUM SERPL-SCNC: 4.3 MMOL/L (ref 3.5–5.2)
PREALB SERPL-MCNC: 29 MG/DL (ref 12–34)
PROT SERPL-MCNC: 6.7 G/DL (ref 6–8.5)
PTH-INTACT SERPL-MCNC: 17 PG/ML (ref 15–65)
RBC # BLD AUTO: 4.83 X10E6/UL (ref 4.14–5.8)
SODIUM SERPL-SCNC: 140 MMOL/L (ref 134–144)
TIBC SERPL-MCNC: 372 UG/DL (ref 250–450)
TSH SERPL DL<=0.005 MIU/L-ACNC: 0.86 UIU/ML (ref 0.45–4.5)
UIBC SERPL-MCNC: 300 UG/DL (ref 111–343)
VIT A SERPL-MCNC: 64.8 UG/DL (ref 20.1–62)
VIT B1 BLD-SCNC: 141.5 NMOL/L (ref 66.5–200)
VIT B12 SERPL-MCNC: 784 PG/ML (ref 232–1245)
VIT B6 SERPL-MCNC: 26.2 UG/L (ref 5.3–46.7)
WBC # BLD AUTO: 8.2 X10E3/UL (ref 3.4–10.8)

## 2021-03-12 ENCOUNTER — TELEPHONE (OUTPATIENT)
Dept: ENT CLINIC | Age: 51
End: 2021-03-12

## 2021-06-10 ENCOUNTER — OFFICE VISIT (OUTPATIENT)
Dept: ENT CLINIC | Age: 51
End: 2021-06-10
Payer: COMMERCIAL

## 2021-06-10 VITALS
TEMPERATURE: 95.3 F | SYSTOLIC BLOOD PRESSURE: 142 MMHG | BODY MASS INDEX: 42.66 KG/M2 | RESPIRATION RATE: 16 BRPM | WEIGHT: 315 LBS | DIASTOLIC BLOOD PRESSURE: 80 MMHG | OXYGEN SATURATION: 96 % | HEART RATE: 86 BPM | HEIGHT: 72 IN

## 2021-06-10 DIAGNOSIS — R26.89 IMBALANCE: ICD-10-CM

## 2021-06-10 DIAGNOSIS — H91.93 DECREASED HEARING OF BOTH EARS: ICD-10-CM

## 2021-06-10 DIAGNOSIS — R42 DIZZINESS: Primary | ICD-10-CM

## 2021-06-10 PROCEDURE — 99203 OFFICE O/P NEW LOW 30 MIN: CPT | Performed by: OTOLARYNGOLOGY

## 2021-06-10 NOTE — PROGRESS NOTES
Chief Complaint   Patient presents with    Check Up     several years imbalanced issues, hearing loss in both, and ear cleaning     New Patient     Visit Vitals  BP (!) 142/80 (BP 1 Location: Left upper arm, BP Patient Position: Sitting, BP Cuff Size: Adult)   Pulse 86   Temp (!) 95.3 °F (35.2 °C)   Resp 16   Ht 6' (1.829 m)   Wt (!) 399 lb (181 kg)   SpO2 96%   BMI 54.11 kg/m²

## 2021-06-10 NOTE — LETTER
6/14/2021 Patient: Bee Caal YOB: 1970 Date of Visit: 6/10/2021 Edd Conrad NP 
[address] [place] [id number] [place] [address] [address] [employer] Dear Edd Conrad NP, Thank you for referring Mr. Dimple Bedyoa to [facility] EAR NOSE AND THROAT [address] for evaluation. My notes for this consultation are attached. If you have questions, please do not hesitate to call me. I look forward to following your patient along with you. Sincerely, Antonella Shipley MD

## 2021-06-10 NOTE — PROGRESS NOTES
Otolaryngology-Head and Neck Surgery  New Patient Visit     Patient: Sherice Mcarthur  YOB: 1970  MRN: [id number]  Date of Service: 6/10/2021    Chief Complaint:  Chronic imbalance     History of Present Illness: Sherice Mcarthur is a 49y.o. year old male who presents today for discussion of chronic dizziness. He notes imbalance over the last few years - stable, no better or worse. Constant, mostly when he gets up    Can walk without concerns for falls    Going up stairs is difficult, needs to hold on the railing    Has to take it slow when getting out of bed    Denies room spinning  No movement feeling     No falls    Driving ok    Not hearing as well, both ears feel similar    Past Medical History:  Past Medical History:   Diagnosis Date    Diabetes ([address]     Hypercholesterolemia     Hypertension        Past Surgical History:   Past Surgical History:   Procedure Laterality Date    HX WISDOM TEETH EXTRACTION         Medications:   Current Outpatient Medications   Medication Instructions    BABY ASPIRIN PO Oral, 1 tablet po daily    cholecalciferol (VITAMIN D3) 2,000 Units, Oral, 2 TIMES DAILY    empagliflozin (JARDIANCE) 25 mg, Oral, DAILY    lisinopriL (PRINIVIL, ZESTRIL) 20 mg tablet TAKE 1 TABLET BY MOUTH EVERY DAY    metFORMIN ER (GLUCOPHAGE XR) 500 mg tablet TAKE 2 TABLETS BY MOUTH TWICE DAILY WITH MEALS    multivitamin (ONE A DAY) tablet 1 Tablet, Oral, DAILY    omega 3-dha-epa-fish oil (Fish Oil) 100-160-1,000 mg cap Oral, 1 tablet po daily    OneTouch UltraSoft Lancets misc No dose, route, or frequency recorded.  OneTouch Verio test strips strip No dose, route, or frequency recorded.     rosuvastatin (CRESTOR) 10 mg tablet TAKE 1 TABLET BY MOUTH EVERY DAY AT NIGHT    Trulicity 1.5 mg, SubCUTAneous, EVERY 7 DAYS       Allergies:   No Known Allergies    Social History:   Social History     Tobacco Use    Smoking status: Never Smoker    Smokeless tobacco: Never Used   Vaping Use  Vaping Use: Never used   Substance Use Topics    Alcohol use: Never    Drug use: Never        Family History:  Family History   Problem Relation Age of Onset    Diabetes Mother     Hypertension Mother     Breast Cancer Mother     Diabetes Maternal Grandmother     Breast Cancer Maternal Grandmother     Diabetes Paternal Grandfather        Review of Systems:    Consitutional: denies fever, excessive weight gain or loss. Eyes: denies diplopia, eye pain. Integumentary: denies new concerning skin lesions. Ears, Nose, Mouth, Throat: denies except as per HPI. Endocrine: denies hot or cold intolerance, increased thirst.  Respiratory: denies cough, hemoptysis, wheezing  Gastrointestinal: denies trouble swallowing, nausea, emesis, regurgitation  Musculoskeletal: denies muscle weakness or wasting  Cardiovascular: denies chest pain, shortness of breath  Neurologic: denies seizures, numbness or tingling, syncope  Hematologic: denies easy bleeding or bruising    Physical Examination:   Vitals:    06/10/21 1126   BP: (!) 142/80   BP 1 Location: Left upper arm   BP Patient Position: Sitting   BP Cuff Size: Adult   Pulse: 86   Temp: (!) 95.3 °F (35.2 °C)   Resp: 16   Height: 6' (1.829 m)   Weight: (!) 399 lb (181 kg)   SpO2: 96%        General: Comfortable, pleasant, appears stated age  Voice: Strong, speaking in full sentences, no stridor    Face: No masses or lesions, facial strength symmetric   Ears: External ears unremarkable. Bilateral ear canal clear. Tympanic membrane clear and intact, with visible landmarks. Clear middle ear space  Nose: External nose unremarkable. Dorsum midline. Anterior rhinoscopy demonstrates no lesions. Septum midline. Turbinates without hypertrophy. Oral Cavity / Oropharynx: No trismus. Mucosa pink and moist. No lesions. Tongue is midline and mobile. Palate elevates symmetrically. Uvula midline. Tonsils unremarkable. Base of tongue soft. Floor of mouth soft. Neck: Supple.  No adenopathy. Thyroid unremarkable. Palpable laryngeal landmarks. Full neck range of motion   Neurologic: CN II - XI intact. Normal gait. Pineda midline, AC > BC. Claudell Jain deferred today due to chair restriction. No spontaneous, gaze induced, head shake nystagmus. Assessment and Plan:   1. Dizziness  2. Decreased hearing  3. Imbalance  - Discussed with patient next steps of audiogram to better evaluate both hearing and imbalance  - Unfortunately due to patients schedule he's not able to do an audiogram today  - We therefore will schedule this and a follow up at his convenience and can better discuss dizziness / imbalance            The patient was instructed to return to clinic if no improvement or progression of symptoms. Signs to watch out for reviewed.       MD Mohan AlvarezBaton [facility]  [address] [place] [address] 6  [facility]  Office Phone: [phone]

## 2021-07-06 DIAGNOSIS — E55.9 VITAMIN D DEFICIENCY: ICD-10-CM

## 2021-07-06 RX ORDER — ACETAMINOPHEN 500 MG
2000 TABLET ORAL 2 TIMES DAILY
Qty: 180 CAPSULE | Refills: 1 | Status: SHIPPED | OUTPATIENT
Start: 2021-07-06 | End: 2021-10-11

## 2021-07-26 ENCOUNTER — OFFICE VISIT (OUTPATIENT)
Dept: ENT CLINIC | Age: 51
End: 2021-07-26
Payer: COMMERCIAL

## 2021-07-26 ENCOUNTER — OFFICE VISIT (OUTPATIENT)
Dept: ENT CLINIC | Age: 51
End: 2021-07-26

## 2021-07-26 VITALS
WEIGHT: 315 LBS | TEMPERATURE: 97.8 F | RESPIRATION RATE: 18 BRPM | DIASTOLIC BLOOD PRESSURE: 86 MMHG | OXYGEN SATURATION: 97 % | BODY MASS INDEX: 42.66 KG/M2 | SYSTOLIC BLOOD PRESSURE: 140 MMHG | HEART RATE: 82 BPM | HEIGHT: 72 IN

## 2021-07-26 DIAGNOSIS — R42 DIZZINESS: Primary | ICD-10-CM

## 2021-07-26 DIAGNOSIS — E11.65 CONTROLLED TYPE 2 DIABETES MELLITUS WITH HYPERGLYCEMIA, WITHOUT LONG-TERM CURRENT USE OF INSULIN (HCC): ICD-10-CM

## 2021-07-26 DIAGNOSIS — H93.13 TINNITUS OF BOTH EARS: ICD-10-CM

## 2021-07-26 DIAGNOSIS — E11.65 TYPE 2 DIABETES MELLITUS WITH HYPERGLYCEMIA, WITHOUT LONG-TERM CURRENT USE OF INSULIN (HCC): ICD-10-CM

## 2021-07-26 DIAGNOSIS — R26.89 IMBALANCE: ICD-10-CM

## 2021-07-26 PROCEDURE — 99213 OFFICE O/P EST LOW 20 MIN: CPT | Performed by: OTOLARYNGOLOGY

## 2021-07-26 PROCEDURE — 92557 COMPREHENSIVE HEARING TEST: CPT | Performed by: AUDIOLOGIST

## 2021-07-26 PROCEDURE — 92567 TYMPANOMETRY: CPT | Performed by: AUDIOLOGIST

## 2021-07-26 NOTE — PROGRESS NOTES
1. Have you been to the ER, urgent care clinic since your last visit? Hospitalized since your last visit? No    2. Have you seen or consulted any other health care providers outside of the [address] [place] [address] since your last visit? Include any pap smears or colon screening.  No   Chief Complaint   Patient presents with    Follow-up     6 weeks follow up on hearing loss and balance problems

## 2021-07-26 NOTE — PROGRESS NOTES
Oc Blanco, a 49y.o. year old male, was seen in ENT clinic today for a hearing evaluation on referral from Dr. Nasrin Casillas. Patient complains primarily of dizziness and balance concerns. He also reports some subjective hearing loss and constant \"shrill\" tinnitus. Patient has a history of noise exposure ( service; concerts) with intermittent hearing protection. He reports that he occasionally will have difficulty understanding when people speak to him. Otoscopy: normal external ear canals and visible tympanic membranes, bilaterally. Tympanometry: RE Type A, normal  LE Type A, normal    SRT: RE Speech Reception Threshold (SRT) was obtained at 15 dBHL LE Speech Reception Threshold (SRT) was obtained at 10 dBHL    WRS: RE Excellent in quiet when words were presented at 55 dBHL. WRS: LE Excellent in quiet when words were presented at 50 dBHL. Pure tone audiometry:  RE: WNL   LE: WNL (borderline-normal noise notch)    normal hearing thresholds bilaterally (borderline noise notch at 4000Hz in the left ear only)    Impressions:  normal hearing sensitivity   Discussed results of today's testing with patient. Patient's hearing is within normal limits (borderline at 4000Hz in the left ear only). Explained that there is a range of what is considered \"normal hearing\" and provided familiar sounds audiogram with patient's left pure-tone thresholds to explain why he may be having occasional difficulty understanding what is being said to him. Recommended hearing recheck in 1 year to monitor noise notch. Also discussed potential masking strategies for tinnitus (fan noise, sound therapy apps, etc.). Patient reports he already sleeps with a fan and/or sound machine, which helps. Provided tinnitus handout today. Recommended follow-up with ENT, with hearing recheck in 1 year or sooner if change in tinnitus and/or hearing is noted. Patient indicated understanding. Plan:  Follow-up with ENT.   Repeat audiogram upon request.    Mo Baptiste   Doctor of Audiology

## 2021-07-26 NOTE — LETTER
7/27/2021    Patient: Sunitha Casas   YOB: 1970   Date of Visit: 7/26/2021     Josefa Noguera NP  [address] [place] [address]  [place] [address]  [address] [place]    Dear Josefa Noguera NP,      Thank you for referring Mr. Fna Iraheta to [facility]AR NOSE AND THRO[facility] [address] for evaluation. My notes for this consultation are attached. If you have questions, please do not hesitate to call me. I look forward to following your patient along with you.       Sincerely,    Tesfaye Rivero MD

## 2021-07-27 RX ORDER — DULAGLUTIDE 1.5 MG/.5ML
1.5 INJECTION, SOLUTION SUBCUTANEOUS
Qty: 2 SYRINGE | Refills: 0 | Status: SHIPPED | OUTPATIENT
Start: 2021-07-27 | End: 2021-08-23

## 2021-07-27 NOTE — PROGRESS NOTES
Otolaryngology-Head and Neck Surgery  Follow Up Patient Visit     Patient: Saunders Sever  YOB: 1970  MRN: [id number]  Date of Service:  7/26/2021    Chief Complaint:  Chronic imbalance     Interval hx:  Presents after audiogram    History of Present Illness: Saunders Sever is a 49y.o. year old male who presents today for discussion of chronic dizziness. He notes imbalance over the last few years - stable, no better or worse. Constant, mostly when he gets up    Can walk without concerns for falls    Going up stairs is difficult, needs to hold on the railing    Has to take it slow when getting out of bed    Denies room spinning  No movement feeling     No falls    Driving ok    Not hearing as well, both ears feel similar    Past Medical History:  Past Medical History:   Diagnosis Date    Diabetes ([facility] [address]     Hypercholesterolemia     Hypertension        Past Surgical History:   Past Surgical History:   Procedure Laterality Date    HX WISDOM TEETH EXTRACTION         Medications:   Current Outpatient Medications   Medication Instructions    BABY ASPIRIN PO Oral, 1 tablet po daily    cholecalciferol (VITAMIN D3) 2,000 Units, Oral, 2 TIMES DAILY    empagliflozin (JARDIANCE) 25 mg, Oral, DAILY    lisinopriL (PRINIVIL, ZESTRIL) 20 mg tablet TAKE 1 TABLET BY MOUTH EVERY DAY    metFORMIN ER (GLUCOPHAGE XR) 500 mg tablet TAKE 2 TABLETS BY MOUTH TWICE DAILY WITH MEALS    multivitamin (ONE A DAY) tablet 1 Tablet, Oral, DAILY    omega 3-dha-epa-fish oil (Fish Oil) 100-160-1,000 mg cap Oral, 1 tablet po daily    OneTouch UltraSoft Lancets misc No dose, route, or frequency recorded.  OneTouch Verio test strips strip No dose, route, or frequency recorded.     rosuvastatin (CRESTOR) 10 mg tablet TAKE 1 TABLET BY MOUTH EVERY DAY AT NIGHT    Trulicity 1.5 mg, SubCUTAneous, EVERY 7 DAYS       Allergies:   No Known Allergies    Social History:   Social History     Tobacco Use    Smoking status: Never Smoker  Smokeless tobacco: Never Used   Vaping Use    Vaping Use: Never used   Substance Use Topics    Alcohol use: Never    Drug use: Never        Family History:  Family History   Problem Relation Age of Onset    Diabetes Mother     Hypertension Mother     Breast Cancer Mother     Diabetes Maternal Grandmother     Breast Cancer Maternal Grandmother     Diabetes Paternal Grandfather        Review of Systems:    Consitutional: denies fever, excessive weight gain or loss. Eyes: denies diplopia, eye pain. Integumentary: denies new concerning skin lesions. Ears, Nose, Mouth, Throat: denies except as per HPI. Endocrine: denies hot or cold intolerance, increased thirst.  Respiratory: denies cough, hemoptysis, wheezing  Gastrointestinal: denies trouble swallowing, nausea, emesis, regurgitation  Musculoskeletal: denies muscle weakness or wasting  Cardiovascular: denies chest pain, shortness of breath  Neurologic: denies seizures, numbness or tingling, syncope  Hematologic: denies easy bleeding or bruising    Physical Examination:   Vitals:    07/26/21 1421 07/26/21 1424   BP: (!) 140/90 (!) 140/86   BP 1 Location: Left upper arm Left upper arm   BP Patient Position: Sitting Sitting   BP Cuff Size: Large adult Large adult   Pulse: 82    Temp: 97.8 °F (36.6 °C)    TempSrc: Temporal    Resp: 18    Height: 6' (1.829 m)    Weight: (!) 387 lb (175.5 kg)    SpO2: 97%         General: Comfortable, pleasant, appears stated age  Voice: Strong, speaking in full sentences, no stridor    Face: No masses or lesions, facial strength symmetric   Ears: External ears unremarkable. Bilateral ear canal clear. Tympanic membrane clear and intact, with visible landmarks. Clear middle ear space  Nose: External nose unremarkable. Dorsum midline. Anterior rhinoscopy demonstrates no lesions. Septum midline. Turbinates without hypertrophy. Oral Cavity / Oropharynx: No trismus. Mucosa pink and moist. No lesions. Tongue is midline and mobile. Palate elevates symmetrically. Uvula midline. Tonsils unremarkable. Base of tongue soft. Floor of mouth soft. Neck: Supple. No adenopathy. Thyroid unremarkable. Palpable laryngeal landmarks. Full neck range of motion   Neurologic: CN II - XI intact. Normal gait. Pineda midline, AC > BC. Claryce Lek deferred today due to chair restriction. No spontaneous, gaze induced, head shake nystagmus. Assessment and Plan:   1. Dizziness  2. Decreased hearing  3. Imbalance  - Reviewed audiogram results with patient - largely normal but mild asymmetry with R ear worse  - Given persistence of dizziness and imbalance over time, will check MRI IAC/brain   - Accordingly, can discuss next steps - neurology eval vs vestibular PT , then possible VNG         The patient was instructed to return to clinic if no improvement or progression of symptoms. Signs to watch out for reviewed.       MD Eunice HuynhVidant [facility]  [address] [place] [address] 6  [facility]  Office Phone: [phone]

## 2021-08-10 ENCOUNTER — OFFICE VISIT (OUTPATIENT)
Dept: FAMILY MEDICINE CLINIC | Age: 51
End: 2021-08-10
Payer: COMMERCIAL

## 2021-08-10 VITALS
HEART RATE: 68 BPM | WEIGHT: 315 LBS | OXYGEN SATURATION: 96 % | SYSTOLIC BLOOD PRESSURE: 140 MMHG | HEIGHT: 74 IN | RESPIRATION RATE: 16 BRPM | DIASTOLIC BLOOD PRESSURE: 88 MMHG | BODY MASS INDEX: 40.43 KG/M2 | TEMPERATURE: 97.2 F

## 2021-08-10 DIAGNOSIS — I10 ESSENTIAL HYPERTENSION: ICD-10-CM

## 2021-08-10 DIAGNOSIS — E66.01 MORBID OBESITY (HCC): ICD-10-CM

## 2021-08-10 DIAGNOSIS — Z11.59 ENCOUNTER FOR HEPATITIS C SCREENING TEST FOR LOW RISK PATIENT: ICD-10-CM

## 2021-08-10 DIAGNOSIS — E11.65 CONTROLLED TYPE 2 DIABETES MELLITUS WITH HYPERGLYCEMIA, WITHOUT LONG-TERM CURRENT USE OF INSULIN (HCC): Primary | ICD-10-CM

## 2021-08-10 PROCEDURE — 99214 OFFICE O/P EST MOD 30 MIN: CPT | Performed by: NURSE PRACTITIONER

## 2021-08-10 RX ORDER — AMLODIPINE BESYLATE 5 MG/1
5 TABLET ORAL DAILY
Qty: 30 TABLET | Refills: 0 | Status: SHIPPED | OUTPATIENT
Start: 2021-08-10 | End: 2021-09-06

## 2021-08-10 NOTE — PROGRESS NOTES
Chief Complaint   Patient presents with    Follow-up     DM   1. Have you been to the ER, urgent care clinic since your last visit? Hospitalized since your last visit? No    2. Have you seen or consulted any other health care providers outside of the [address] [place] [address] since your last visit? Include any pap smears or colon screening.  No

## 2021-08-10 NOTE — PATIENT INSTRUCTIONS
Learning About Type 2 Diabetes  What is type 2 diabetes? Type 2 diabetes is a condition in which you have too much sugar (glucose) in your blood. Glucose is a type of sugar produced in your body when carbohydrates and other foods are digested. It provides energy to cells throughout the body. Normally, blood sugar levels increase after you eat a meal. When blood sugar rises, cells in the pancreas release insulin, which causes the body to absorb sugar from the blood and lowers the blood sugar level to normal.  When you have type 2 diabetes, sugar stays in the blood rather than entering the body's cells to be used for energy. This results in high blood sugar. It happens when your body can't use insulin the right way. Over time, high blood sugar can harm many parts of the body, such as your eyes, heart, blood vessels, nerves, and kidneys. It can also increase your risk for other health problems (complications). What can you expect with type 2 diabetes? Fei Robison keep hearing about how important it is to keep your blood sugar within a target range. That's because over time, high blood sugar can lead to serious problems. It can:  · Harm your eyes, nerves, and kidneys. · Damage your blood vessels, leading to heart disease and stroke. · Reduce blood flow and cause nerve damage to parts of your body, especially your feet. This can cause slow healing and pain when you walk. · Make your immune system weak and less able to fight infections. When people hear the word \"diabetes,\" they often think of problems like these. But daily care and treatment can help prevent or delay these problems. The goal is to keep your blood sugar in a target range. That's the best way to reduce your chance of having more problems from diabetes. What are the symptoms? Some people who have type 2 diabetes may not have any symptoms early on.  Many people with the disease don't even know they have it at first. But with time, diabetes starts to cause symptoms. You have most symptoms of type 2 diabetes when your blood sugar is either too high or too low. The most common symptoms of high blood sugar include:  · Thirst.  · Needing to urinate often. · Weight loss. · Blurry vision. The symptoms of low blood sugar include:  · Sweating. · Shakiness. · Weakness. · Hunger. · Confusion. You're not likely to get symptoms of low blood sugar unless you take insulin or use certain diabetes medicines that lower blood sugar. How can you help prevent type 2 diabetes? There are things you can do to help prevent type 2 diabetes. Stay at a healthy weight. Exercise regularly, and eat healthy foods. Even small changes can make a difference. If you have prediabetes, the medicine metformin can help prevent type 2 diabetes. How is type 2 diabetes treated? Treatment for type 2 diabetes will change over time to meet your needs. But the focus of your treatment will usually be to keep your blood sugar levels in your target range. This will help prevent problems such as eye, kidney, heart, blood vessel, and nerve disease. Some people may need medicines to help their bodies make insulin or decrease insulin resistance. Some medicines slow down how quickly the body absorbs carbohydrates. Treatment to manage type 2 diabetes includes:  · Making healthy food choices and being active. · Losing weight, if you need to. · Seeing your doctor regularly. · Keeping your blood sugar in your target range. · Taking medicines, if you need them. · Quitting smoking, if you smoke. · Keeping your blood pressure and cholesterol under control. Follow-up care is a key part of your treatment and safety. Be sure to make and go to all appointments, and call your doctor if you are having problems. It's also a good idea to know your test results and keep a list of the medicines you take. Where can you learn more?   Go to http://www.gray.com/  Enter [id number] in the search box to learn more about \"Learning About Type 2 Diabetes. \"  Current as of: August 31, 2020               Content Version: 12.8  © 2006-2021 [employer]. Care instructions adapted under license by [employer] (which disclaims liability or warranty for this information). If you have questions about a medical condition or this instruction, always ask your healthcare professional. [address] any warranty or liability for your use of this information. Learning About Diabetes and Exercise  Can you exercise if you have diabetes? When you have diabetes, it's important to get regular exercise. This helps control your blood sugar level. You can still play sports, run, ride a bike, swim, and do other activities when you have diabetes. How does exercise help when you have diabetes? Getting regular exercise can help control your blood sugar. Your body turns the food you eat into glucose, a type of sugar. You need this sugar for energy. When you have diabetes, the sugar builds up in your blood. But when you exercise, your body uses sugar. This helps keep it from building up in your blood and results in lower blood sugar and better control of diabetes. Exercise may help you in other ways too. It can help you reach and stay at a healthy weight. It also helps improve blood pressure and cholesterol, which can reduce the risk of heart disease. Exercise can make you feel stronger and happier. It can help you relax and sleep better. And it can give you confidence in other things you do. Exercising safely when you have diabetes  Before you start a new exercise program, talk to your doctor about how and when to exercise. Some types of exercise can be harmful if your diabetes is causing other problems, such as problems with your feet. Your doctor can tell you what types of exercise are good choices for you. Here are some general safety tips.   · Check your blood sugar before and after you exercise. Be careful about what you eat, especially if you take insulin or other medicines for diabetes. · Take steps to avoid blood sugar problems. ? Ask your doctor what blood sugar range is safe for you when you exercise. ? If you take medicine or insulin that lowers blood sugar, check your blood sugar before you exercise. ? If your blood sugar is less than 90 mg/dL, you may need to eat a carbohydrate snack first.  ? Be careful when you exercise if your blood sugar is too high. · Try to exercise at about the same time each day. This may help keep your blood sugar steady. If you want to exercise more, slowly increase how hard or long you exercise. · Have someone with you when you exercise. Or exercise at a gym. You may need help if your blood sugar drops too low. · Keep some quick-sugar food with you. You may get symptoms of low blood sugar during exercise or up to 24 hours later. · Use proper footwear and the right equipment. · Pay attention to your body. If you are used to exercising and notice that you cannot do as much as usual, talk to your doctor. Follow-up care is a key part of your treatment and safety. Be sure to make and go to all appointments, and call your doctor if you are having problems. It's also a good idea to know your test results and keep a list of the medicines you take. Where can you learn more? Go to http://www.gray.com/  Enter C492 in the search box to learn more about \"Learning About Diabetes and Exercise. \"  Current as of: August 31, 2020               Content Version: 12.8  © 2006-2021 [employer]. Care instructions adapted under license by [employer] (which disclaims liability or warranty for this information).  If you have questions about a medical condition or this instruction, always ask your healthcare professional. [place] [address] any warranty or liability for your use of this information.

## 2021-08-10 NOTE — PROGRESS NOTES
Subjective  Chief Complaint   Patient presents with    Follow-up     DM     HPI:  Malcolm Lino is a 49 y.o. male. Patient presents for management of type 2 diabetes and hypertension. Management of T2DM-  Current meds: Metformin 1000 mg twice daily, Trulicity 1.5 mg weekly- never started Jardiance  Last A1c:   Lab Results   Component Value Date/Time    Hemoglobin A1c 9.5 (H) 02/24/2021 07:56 AM    Hemoglobin A1c, External 8.7 11/11/2019 12:00 AM   Home glucose monitoring range: has supplies, does not check  Hypoglycemia: denies  Increased thirst, appetite, and urination: denies  Vision changes, last eye exam: denies, spring 2021  Checks feet regularly, denies numbness and injury: yes, reports stable numbness for years  Last foot exam: 11/2020  Compliant with statin: Rosuvastatin  Compliant with ACEI: Lisinopril  Last microalbumin:   Lab Results   Component Value Date/Time    Microalb/Creat ratio (ug/mg creat.) 4 11/12/2020 08:52 AM   Pneumovax: complete  Compliant with diet: no  Complaint with exercise: no  Denies personal h/o CV disease including MI, stroke, and PVD: yes    Management of HTN-  Current meds: Lisinopril 20 mg daily  Home BP readings: does not check, has a cuff  High salt intake: no  Stays hydrated: yes  Regular exercise: no  Denies lightheadedness, dizziness, headaches, chest pain, palpitations, lower extremity edema, and calf pain with exertion.        Past Medical History:   Diagnosis Date    Diabetes ([facility] [address]     Hypercholesterolemia     Hypertension      Family History   Problem Relation Age of Onset    Diabetes Mother     Hypertension Mother     Breast Cancer Mother     Diabetes Maternal Grandmother     Breast Cancer Maternal Grandmother     Diabetes Paternal Grandfather      Social History     Socioeconomic History    Marital status:      Spouse name: Not on file    Number of children: Not on file    Years of education: Not on file    Highest education level: Not on file Occupational History    Not on file   Tobacco Use    Smoking status: Never Smoker    Smokeless tobacco: Never Used   Vaping Use    Vaping Use: Never used   Substance and Sexual Activity    Alcohol use: Never    Drug use: Never    Sexual activity: Yes   Other Topics Concern    Not on file   Social History Narrative    Not on file     Social Determinants of Health     Financial Resource Strain:     Difficulty of Paying Living Expenses:    Food Insecurity:     Worried About Running Out of Food in the Last Year:     [address] in the Last Year:    Transportation Needs:     Lack of Transportation (Medical):  Lack of Transportation (Non-Medical):    Physical Activity:     Days of Exercise per Week:     Minutes of Exercise per Session:    Stress:     Feeling of Stress :    Social Connections:     Frequency of Communication with Friends and Family:     Frequency of Social Gatherings with Friends and Family:     Attends [employer] witness Services:     Active Member of Clubs or Organizations:     Attends Club or Organization Meetings:     Marital Status:    Intimate Partner Violence:     Fear of Current or Ex-Partner:     Emotionally Abused:     Physically Abused:     Sexually Abused:      Current Outpatient Medications on File Prior to Visit   Medication Sig Dispense Refill    Trulicity 1.5 YL/7.7 mL sub-q pen 0.5 ML BY SUBCUTANEOUS ROUTE EVERY SEVEN (7) DAYS. 2 Syringe 0    metFORMIN ER (GLUCOPHAGE XR) 500 mg tablet TAKE 2 TABLETS BY MOUTH TWICE DAILY WITH MEALS 120 Tablet 0    cholecalciferol (VITAMIN D3) (2,000 UNITS /50 MCG) cap capsule TAKE 2,000 UNITS BY MOUTH TWO (2) TIMES A DAY. 180 Capsule 1    rosuvastatin (CRESTOR) 10 mg tablet TAKE 1 TABLET BY MOUTH EVERY DAY AT NIGHT 90 Tab 3    lisinopriL (PRINIVIL, ZESTRIL) 20 mg tablet TAKE 1 TABLET BY MOUTH EVERY DAY 90 Tab 0    multivitamin (ONE A DAY) tablet Take 1 Tab by mouth daily.  BABY ASPIRIN PO Take  by mouth.  1 tablet po daily      omega 3-dha-epa-fish oil (Fish Oil) 100-160-1,000 mg cap Take  by mouth. 1 tablet po daily      OneTouch UltraSoft Lancets misc       OneTouch Verio test strips strip       [DISCONTINUED] empagliflozin (JARDIANCE) 25 mg tablet Take 1 Tab by mouth daily. (Patient not taking: Reported on 6/10/2021) 90 Tab 0     No current facility-administered medications on file prior to visit. No Known Allergies  ROS  See HPI for pertinent ROS. Objective  Visit Vitals  BP (!) 140/88   Pulse 68   Temp 97.2 °F (36.2 °C) (Temporal)   Resp 16   Ht 6' 2\" (1.88 m)   Wt (!) 387 lb (175.5 kg)   SpO2 96%   BMI 49.69 kg/m²       Physical Exam  Vitals and nursing note reviewed. Constitutional:       General: He is not in acute distress. Appearance: Normal appearance. He is morbidly obese. HENT:      Head: Normocephalic. Eyes:      Extraocular Movements: Extraocular movements intact. Cardiovascular:      Rate and Rhythm: Normal rate and regular rhythm. Heart sounds: Normal heart sounds. Pulmonary:      Effort: Pulmonary effort is normal.      Breath sounds: Normal breath sounds. Musculoskeletal:         General: Normal range of motion. Right lower leg: No edema. Left lower leg: No edema. Skin:     General: Skin is warm and dry. Neurological:      Mental Status: He is alert and oriented to person, place, and time. Psychiatric:         Mood and Affect: Mood normal.         Behavior: Behavior normal.          Assessment & Plan      ICD-10-CM ICD-9-CM    1. Controlled type 2 diabetes mellitus with hyperglycemia, without long-term current use of insulin (HCC)  E11.65 250.80 HEMOGLOBIN A1C WITH EAG     790.29 empagliflozin (JARDIANCE) 25 mg tablet   2. Essential hypertension  I10 401.9 amLODIPine (NORVASC) 5 mg tablet   3. Morbid obesity ([address]  E66.01 278.01    4.  Encounter for hepatitis C screening test for low risk patient  Z11.59 V73.89 HCV AB W/RFLX TO VIVIANA     Diagnoses and all orders for this visit:    1. Controlled type 2 diabetes mellitus with hyperglycemia, without long-term current use of insulin (HCC)  Rechecking A1c today which will likely remain above goal.  I am also restarting Jardiance as previously ordered. Reinforced the importance of getting his A1c below 7.5% for upcoming bariatric surgery. Reinforced importance of dietary and exercise compliance. -     HEMOGLOBIN A1C WITH EAG  -     empagliflozin (JARDIANCE) 25 mg tablet; Take 1 Tablet by mouth daily. 2. Essential hypertension  BP is above goal in the office today on 3 readings. Start amlodipine as ordered and continue lisinopril daily. Reinforced importance of home BP monitoring. Check BP readings 1 to 2 hours after taking medication and after sitting quietly for 5 minutes with both feet flat on the floor and arm at heart level. Call if readings are consistently greater than 140/90 on either number. Increase regular exercise, limit salt intake, and stay well-hydrated. -     amLODIPine (NORVASC) 5 mg tablet; Take 1 Tablet by mouth daily. 3. Morbid obesity ([address]  Weight remains stable. Bariatric surgery scheduled for January. 4. Encounter for hepatitis C screening test for low risk patient  -     HCV AB W/RFLX TO VIVIANA      Follow-up and Dispositions    · Return in about 1 month (around 9/10/2021) for follow up, hypertension, T2DM, nonfasting, VV ok.            Elmer Mcgarry NP

## 2021-08-11 LAB
EST. AVERAGE GLUCOSE BLD GHB EST-MCNC: 194 MG/DL
HBA1C MFR BLD: 8.4 % (ref 4.8–5.6)
HCV AB S/CO SERPL IA: <0.1 S/CO RATIO (ref 0–0.9)
HCV AB SERPL QL IA: NORMAL

## 2021-08-11 NOTE — PROGRESS NOTES
Your one time hepatitis C screening is negative. A1c has improved to 8.4%. This remains above goal for diabetes and surgery. I want him to start Jardiance as we discussed yesterday and I am confident he will be below goal the next time we check his A1c. Please remind him this is a small step to big gain.

## 2021-08-22 DIAGNOSIS — E11.65 CONTROLLED TYPE 2 DIABETES MELLITUS WITH HYPERGLYCEMIA, WITHOUT LONG-TERM CURRENT USE OF INSULIN (HCC): ICD-10-CM

## 2021-08-22 DIAGNOSIS — E11.65 TYPE 2 DIABETES MELLITUS WITH HYPERGLYCEMIA, WITHOUT LONG-TERM CURRENT USE OF INSULIN (HCC): ICD-10-CM

## 2021-08-23 RX ORDER — DULAGLUTIDE 1.5 MG/.5ML
1.5 INJECTION, SOLUTION SUBCUTANEOUS
Qty: 12 SYRINGE | Refills: 0 | Status: SHIPPED | OUTPATIENT
Start: 2021-08-23 | End: 2021-12-11 | Stop reason: DRUGHIGH

## 2021-09-06 DIAGNOSIS — I10 ESSENTIAL HYPERTENSION: ICD-10-CM

## 2021-09-06 RX ORDER — AMLODIPINE BESYLATE 5 MG/1
TABLET ORAL
Qty: 30 TABLET | Refills: 0 | Status: SHIPPED | OUTPATIENT
Start: 2021-09-06 | End: 2021-10-05

## 2021-10-05 ENCOUNTER — OFFICE VISIT (OUTPATIENT)
Dept: FAMILY MEDICINE CLINIC | Age: 51
End: 2021-10-05
Payer: COMMERCIAL

## 2021-10-05 VITALS
HEIGHT: 74 IN | HEART RATE: 82 BPM | BODY MASS INDEX: 40.43 KG/M2 | RESPIRATION RATE: 16 BRPM | WEIGHT: 315 LBS | TEMPERATURE: 97.3 F | OXYGEN SATURATION: 98 % | DIASTOLIC BLOOD PRESSURE: 80 MMHG | SYSTOLIC BLOOD PRESSURE: 118 MMHG

## 2021-10-05 DIAGNOSIS — I10 ESSENTIAL HYPERTENSION: Primary | ICD-10-CM

## 2021-10-05 DIAGNOSIS — E11.65 CONTROLLED TYPE 2 DIABETES MELLITUS WITH HYPERGLYCEMIA, WITHOUT LONG-TERM CURRENT USE OF INSULIN (HCC): ICD-10-CM

## 2021-10-05 DIAGNOSIS — E66.01 MORBID OBESITY (HCC): ICD-10-CM

## 2021-10-05 PROCEDURE — 3052F HG A1C>EQUAL 8.0%<EQUAL 9.0%: CPT | Performed by: NURSE PRACTITIONER

## 2021-10-05 PROCEDURE — 99214 OFFICE O/P EST MOD 30 MIN: CPT | Performed by: NURSE PRACTITIONER

## 2021-10-05 NOTE — PROGRESS NOTES
Chief Complaint   Patient presents with    Follow-up     HTN, DM   1. Have you been to the ER, urgent care clinic since your last visit? Hospitalized since your last visit? No    2. Have you seen or consulted any other health care providers outside of the [address] [place] [address] since your last visit? Include any pap smears or colon screening.  No

## 2021-10-05 NOTE — PATIENT INSTRUCTIONS
Learning About Carbohydrate (Carb) Counting and Eating Out When You Have Diabetes  Why plan your meals? Meal planning can be a key part of managing diabetes. Planning meals and snacks with the right balance of carbohydrate, protein, and fat can help you keep your blood sugar at the target level you set with your doctor. You don't have to eat special foods. You can eat what your family eats, including sweets once in a while. But you do have to pay attention to how often you eat and how much you eat of certain foods. You may want to work with a dietitian or a certified diabetes educator. He or she can give you tips and meal ideas and can answer your questions about meal planning. This health professional can also help you reach a healthy weight if that is one of your goals. What should you know about eating carbs? Managing the amount of carbohydrate (carbs) you eat is an important part of healthy meals when you have diabetes. Carbohydrate is found in many foods. · Learn which foods have carbs. And learn the amounts of carbs in different foods. ? Bread, cereal, pasta, and rice have about 15 grams of carbs in a serving. A serving is 1 slice of bread (1 ounce), ½ cup of cooked cereal, or 1/3 cup of cooked pasta or rice. ? Fruits have 15 grams of carbs in a serving. A serving is 1 small fresh fruit, such as an apple or orange; ½ of a banana; ½ cup of cooked or canned fruit; ½ cup of fruit juice; 1 cup of melon or raspberries; or 2 tablespoons of dried fruit. ? Milk and no-sugar-added yogurt have 15 grams of carbs in a serving. A serving is 1 cup of milk or 2/3 cup of no-sugar-added yogurt. ? Starchy vegetables have 15 grams of carbs in a serving. A serving is ½ cup of mashed potatoes or sweet potato; 1 cup winter squash; ½ of a small baked potato; ½ cup of cooked beans; or ½ cup cooked corn or green peas.   · Learn how much carbs to eat each day and at each meal. A dietitian or CDE can teach you how to keep track of the amount of carbs you eat. This is called carbohydrate counting. · If you are not sure how to count carbohydrate grams, use the Plate Method to plan meals. It is a good, quick way to make sure that you have a balanced meal. It also helps you spread carbs throughout the day. ? Divide your plate by types of foods. Put non-starchy vegetables on half the plate, meat or other protein food on one-quarter of the plate, and a grain or starchy vegetable in the final quarter of the plate. To this you can add a small piece of fruit and 1 cup of milk or yogurt, depending on how many carbs you are supposed to eat at a meal.  · Try to eat about the same amount of carbs at each meal. Do not \"save up\" your daily allowance of carbs to eat at one meal.  · Proteins have very little or no carbs per serving. Examples of proteins are beef, chicken, turkey, fish, eggs, tofu, cheese, cottage cheese, and peanut butter. A serving size of meat is 3 ounces, which is about the size of a deck of cards. Examples of meat substitute serving sizes (equal to 1 ounce of meat) are 1/4 cup of cottage cheese, 1 egg, 1 tablespoon of peanut butter, and ½ cup of tofu. How can you eat out and still eat healthy? · Learn to estimate the serving sizes of foods that have carbohydrate. If you measure food at home, it will be easier to estimate the amount in a serving of restaurant food. · If the meal you order has too much carbohydrate (such as potatoes, corn, or baked beans), ask to have a low-carbohydrate food instead. Ask for a salad or green vegetables. · If you use insulin, check your blood sugar before and after eating out to help you plan how much to eat in the future. · If you eat more carbohydrate at a meal than you had planned, take a walk or do other exercise. This will help lower your blood sugar. What are some tips for eating healthy? · Limit saturated fat, such as the fat from meat and dairy products.  This is a healthy choice because people who have diabetes are at higher risk of heart disease. So choose lean cuts of meat and nonfat or low-fat dairy products. Use olive or canola oil instead of butter or shortening when cooking. · Don't skip meals. Your blood sugar may drop too low if you skip meals and take insulin or certain medicines for diabetes. · Check with your doctor before you drink alcohol. Alcohol can cause your blood sugar to drop too low. Alcohol can also cause a bad reaction if you take certain diabetes medicines. Follow-up care is a key part of your treatment and safety. Be sure to make and go to all appointments, and call your doctor if you are having problems. It's also a good idea to know your test results and keep a list of the medicines you take. Where can you learn more? Go to http://www.peres.com/  Enter I147 in the search box to learn more about \"Learning About Carbohydrate (Carb) Counting and Eating Out When You Have Diabetes. \"  Current as of: December 17, 2020               Content Version: 13.0  © 2006-2021 [employer]. Care instructions adapted under license by [employer] (which disclaims liability or warranty for this information). If you have questions about a medical condition or this instruction, always ask your healthcare professional. [address] any warranty or liability for your use of this information. Learning About Diabetes and Exercise  Can you exercise if you have diabetes? When you have diabetes, it's important to get regular exercise. It can help you manage your blood sugar level. You can still play sports, run, ride a bike, swim, and do other activities when you have diabetes. How does exercise help when you have diabetes? Getting regular exercise can help control your blood sugar. Your body turns the food you eat into glucose, a type of sugar. You need this sugar for energy.  When you have diabetes, the sugar builds up in your blood. But when you exercise, your body uses sugar. This helps keep it from building up in your blood and results in lower blood sugar and better control of diabetes. Exercise may help you in other ways too. It can help you reach and stay at a healthy weight. It also helps improve blood pressure and cholesterol, which can reduce the risk of heart disease. Exercise can make you feel stronger and happier. It can help you relax and sleep better. And it can give you confidence in other things you do. Exercising safely when you have diabetes  Before you start a new exercise program, talk to your doctor about how and when to exercise. Some types of exercise can be harmful if your diabetes is causing other problems, such as problems with your feet. Your doctor can tell you what types of exercise are good choices for you. Here are some general safety tips. · Take steps to avoid blood sugar problems. ? Check your blood sugar before and after you exercise. ? Ask your doctor what blood sugar range is safe for you when you exercise. ? If you take medicine or insulin that lowers blood sugar, check your blood sugar before you exercise. ? If your blood sugar is less than 90 mg/dL, you may need to eat a carbohydrate snack first.  ? Be careful when you exercise if your blood sugar is too high. Make sure to drink plenty of water. · Try to exercise at about the same time each day. This may help keep your blood sugar steady. If you want to exercise more, slowly increase how hard or long you exercise. · Have someone with you when you exercise. Or exercise at a gym. You may need help if your blood sugar drops too low. · Keep some quick-sugar food with you. You may get symptoms of low blood sugar during exercise or up to 24 hours later. · Use proper footwear and the right equipment. · Pay attention to your body.    If you are used to exercising and notice that you cannot do as much as usual, talk to your doctor. Follow-up care is a key part of your treatment and safety. Be sure to make and go to all appointments, and call your doctor if you are having problems. It's also a good idea to know your test results and keep a list of the medicines you take. Where can you learn more? Go to http://www.gray.com/  Enter C492 in the search box to learn more about \"Learning About Diabetes and Exercise. \"  Current as of: August 31, 2020               Content Version: 13.0  © 2006-2021 [employer]. Care instructions adapted under license by [employer] (which disclaims liability or warranty for this information). If you have questions about a medical condition or this instruction, always ask your healthcare professional. [address] any warranty or liability for your use of this information.

## 2021-10-05 NOTE — PROGRESS NOTES
Subjective  Chief Complaint   Patient presents with    Follow-up     HTN, DM     HPI:  Nancy Siddiqui is a 49 y.o. male. Patient presents for follow-up management of hypertension and T2DM. At his last visit we started amlodipine 5 mg daily. A1c 8.4% when last checked. He has started [place] and continues to take Metformin and Trulicty. Does not check home glucose readings. Management of HTN-  Current meds: Lisinopril 20 mg, amlodipine 5 mg  Home BP readings: does not check, BP cuff is not checking correctly  High salt intake: no  Stays hydrated: yes  Regular exercise: no  Denies lightheadedness, dizziness, headaches, chest pain, palpitations, lower extremity edema, and calf pain with exertion. Past Medical History:   Diagnosis Date    Diabetes ([facility] [address]     Hypercholesterolemia     Hypertension      Family History   Problem Relation Age of Onset    Diabetes Mother     Hypertension Mother     Breast Cancer Mother     Diabetes Maternal Grandmother     Breast Cancer Maternal Grandmother     Diabetes Paternal Grandfather      Social History     Socioeconomic History    Marital status:      Spouse name: Not on file    Number of children: Not on file    Years of education: Not on file    Highest education level: Not on file   Occupational History    Not on file   Tobacco Use    Smoking status: Never Smoker    Smokeless tobacco: Never Used   Vaping Use    Vaping Use: Never used   Substance and Sexual Activity    Alcohol use: Never    Drug use: Never    Sexual activity: Yes   Other Topics Concern    Not on file   Social History Narrative    Not on file     Social Determinants of Health     Financial Resource Strain:     Difficulty of Paying Living Expenses:    Food Insecurity:     Worried About Running Out of Food in the Last Year:     [address] in the Last Year:    Transportation Needs:     Lack of Transportation (Medical):      Lack of Transportation (Non-Medical): Physical Activity:     Days of Exercise per Week:     Minutes of Exercise per Session:    Stress:     Feeling of Stress :    Social Connections:     Frequency of Communication with Friends and Family:     Frequency of Social Gatherings with Friends and Family:     Attends Shinto Services:     Active Member of Clubs or Organizations:     Attends Club or Organization Meetings:     Marital Status:    Intimate Partner Violence:     Fear of Current or Ex-Partner:     Emotionally Abused:     Physically Abused:     Sexually Abused:      Current Outpatient Medications on File Prior to Visit   Medication Sig Dispense Refill    [DISCONTINUED] amLODIPine (NORVASC) 5 mg tablet TAKE 1 TABLET BY MOUTH EVERY DAY 30 Tablet 0    Trulicity 1.5 AM/7.0 mL sub-q pen 0.5 ML BY SUBCUTANEOUS ROUTE EVERY SEVEN (7) DAYS. 12 Syringe 0    empagliflozin (JARDIANCE) 25 mg tablet Take 1 Tablet by mouth daily. 90 Tablet 0    metFORMIN ER (GLUCOPHAGE XR) 500 mg tablet TAKE 2 TABLETS BY MOUTH TWICE DAILY WITH MEALS 120 Tablet 0    cholecalciferol (VITAMIN D3) (2,000 UNITS /50 MCG) cap capsule TAKE 2,000 UNITS BY MOUTH TWO (2) TIMES A DAY. 180 Capsule 1    rosuvastatin (CRESTOR) 10 mg tablet TAKE 1 TABLET BY MOUTH EVERY DAY AT NIGHT 90 Tab 3    lisinopriL (PRINIVIL, ZESTRIL) 20 mg tablet TAKE 1 TABLET BY MOUTH EVERY DAY 90 Tab 0    multivitamin (ONE A DAY) tablet Take 1 Tab by mouth daily.  BABY ASPIRIN PO Take  by mouth. 1 tablet po daily      omega 3-dha-epa-fish oil (Fish Oil) 100-160-1,000 mg cap Take  by mouth. 1 tablet po daily      OneTouch UltraSoft Lancets misc       OneTouch Verio test strips strip       amLODIPine (NORVASC) 5 mg tablet TAKE 1 TABLET BY MOUTH EVERY DAY 90 Tablet 0     No current facility-administered medications on file prior to visit. No Known Allergies  ROS  See HPI for pertinent ROS.      Objective  Visit Vitals  /80 (BP 1 Location: Right upper arm, BP Patient Position: Sitting)   Pulse 82   Temp 97.3 °F (36.3 °C) (Temporal)   Resp 16   Ht 6' 2\" (1.88 m)   Wt (!) 381 lb 6 oz (173 kg)   SpO2 98%   BMI 48.97 kg/m²       Physical Exam  Vitals and nursing note reviewed. Constitutional:       General: He is not in acute distress. Appearance: Normal appearance. He is morbidly obese. HENT:      Head: Normocephalic. Eyes:      Extraocular Movements: Extraocular movements intact. Cardiovascular:      Rate and Rhythm: Normal rate and regular rhythm. Heart sounds: Normal heart sounds. Pulmonary:      Effort: Pulmonary effort is normal.      Breath sounds: Normal breath sounds. Musculoskeletal:         General: Normal range of motion. Right lower leg: No edema. Left lower leg: No edema. Skin:     General: Skin is warm and dry. Neurological:      Mental Status: He is alert and oriented to person, place, and time. Psychiatric:         Mood and Affect: Mood normal.         Behavior: Behavior normal.          Assessment & Plan      ICD-10-CM ICD-9-CM    1. Essential hypertension  I10 401.9    2. Controlled type 2 diabetes mellitus with hyperglycemia, without long-term current use of insulin ([facility])  E11.65 250.80      790.29    3. Morbid obesity ([facility] [address]  E66.01 278.01      Diagnoses and all orders for this visit:    1. Essential hypertension  BP is below goal in the office today. Continue current medication, Amlodipine refilled by pharmacy request. Check BP readings weekly and call if consistently >140/90 on either number. 2. Controlled type 2 diabetes mellitus with hyperglycemia, without long-term current use of insulin ([address]  Tolerating Jardiance. Continue Metformin and Trulicity as well. A1c check due at f/u in 2 months. Continue to work on weight loss, increasing regular exercise, and dietary complaince. 3. Morbid obesity ([address]  Weight is down 6 pounds since his last visit.      Follow-up and Dispositions    · Return in about 2 months (around 12/5/2021) for follow up, hypertension, T2DM, nonfasting (diabetic measures).            Stanton Major NP

## 2021-10-23 DIAGNOSIS — I10 ESSENTIAL HYPERTENSION: ICD-10-CM

## 2021-10-25 RX ORDER — LISINOPRIL 20 MG/1
TABLET ORAL
Qty: 90 TABLET | Refills: 0 | Status: SHIPPED | OUTPATIENT
Start: 2021-10-25 | End: 2022-01-24

## 2021-12-09 ENCOUNTER — OFFICE VISIT (OUTPATIENT)
Dept: FAMILY MEDICINE CLINIC | Age: 51
End: 2021-12-09
Payer: COMMERCIAL

## 2021-12-09 VITALS
OXYGEN SATURATION: 98 % | SYSTOLIC BLOOD PRESSURE: 152 MMHG | TEMPERATURE: 96.9 F | HEIGHT: 74 IN | DIASTOLIC BLOOD PRESSURE: 88 MMHG | WEIGHT: 315 LBS | HEART RATE: 84 BPM | BODY MASS INDEX: 40.43 KG/M2 | RESPIRATION RATE: 16 BRPM

## 2021-12-09 DIAGNOSIS — E66.01 MORBID OBESITY (HCC): ICD-10-CM

## 2021-12-09 DIAGNOSIS — E11.65 CONTROLLED TYPE 2 DIABETES MELLITUS WITH HYPERGLYCEMIA, WITHOUT LONG-TERM CURRENT USE OF INSULIN (HCC): ICD-10-CM

## 2021-12-09 DIAGNOSIS — E11.9 ENCOUNTER FOR DIABETIC FOOT EXAM (HCC): ICD-10-CM

## 2021-12-09 DIAGNOSIS — I10 ESSENTIAL HYPERTENSION: Primary | ICD-10-CM

## 2021-12-09 DIAGNOSIS — Z23 ENCOUNTER FOR IMMUNIZATION: ICD-10-CM

## 2021-12-09 DIAGNOSIS — Z28.20 VACCINE REFUSED BY PATIENT: ICD-10-CM

## 2021-12-09 PROCEDURE — 3052F HG A1C>EQUAL 8.0%<EQUAL 9.0%: CPT | Performed by: NURSE PRACTITIONER

## 2021-12-09 PROCEDURE — 90471 IMMUNIZATION ADMIN: CPT | Performed by: NURSE PRACTITIONER

## 2021-12-09 PROCEDURE — 90750 HZV VACC RECOMBINANT IM: CPT | Performed by: NURSE PRACTITIONER

## 2021-12-09 PROCEDURE — 99214 OFFICE O/P EST MOD 30 MIN: CPT | Performed by: NURSE PRACTITIONER

## 2021-12-09 NOTE — PROGRESS NOTES
Subjective  Chief Complaint   Patient presents with    Follow-up     HTN, DM     HPI:  Guido Broussard is a 49 y.o. male. Patient presents for management of hypertension and diabetes. Bariatric surgery has been delayed to late February after missing two appointments. Management of HTN-  Current meds: Amlodipine 5 mg, lisinopril 20 mg daily, did not take meds today  Home BP readings: high 120s- low 130s/low80s  Denies lightheadedness, dizziness, headaches, chest pain, palpitations, and lower extremity edema.     Management of T2DM-  Current meds: Jardiance 25 mg, Trulicity 1.5 mg, Metformin 1000 mg twice daily  Compliant with medication: Yes  Last A1c:   Lab Results   Component Value Date/Time    Hemoglobin A1c 8.4 (H) 08/10/2021 10:36 AM    Hemoglobin A1c, External 8.7 11/11/2019 12:00 AM   Home glucose monitoring range: does not check, has supplies  Hypoglycemia: Denies  Increased thirst, appetite, and urination: Denies  Vision changes, last eye exam: Denies, summer 2021- [facility]  Checks feet regularly, numbness and injury: Yes, diabetic neuropathy  Last foot exam: Due  Compliant with statin: Rosuvastatin  Compliant with ACEI: Lisinopril  Last microalbumin: due  Pneumovax: Complete  Compliant with diet: trying hard  Complaint with exercise: no  Personal h/o CV disease including MI, stroke, and PVD: denies        Past Medical History:   Diagnosis Date    Diabetes ([facility] [address]     Hypercholesterolemia     Hypertension      Family History   Problem Relation Age of Onset    Diabetes Mother     Hypertension Mother     Breast Cancer Mother     Diabetes Maternal Grandmother     Breast Cancer Maternal Grandmother     Diabetes Paternal Grandfather      Social History     Socioeconomic History    Marital status:      Spouse name: Not on file    Number of children: Not on file    Years of education: Not on file    Highest education level: Not on file   Occupational History    Not on file   Tobacco Use    Smoking status: Never Smoker    Smokeless tobacco: Never Used   Vaping Use    Vaping Use: Never used   Substance and Sexual Activity    Alcohol use: Never    Drug use: Never    Sexual activity: Yes   Other Topics Concern    Not on file   Social History Narrative    Not on file     Social Determinants of Health     Financial Resource Strain:     Difficulty of Paying Living Expenses: Not on file   Food Insecurity:     Worried About Running Out of Food in the Last Year: Not on file    Kathi of Food in the Last Year: Not on file   Transportation Needs:     Lack of Transportation (Medical): Not on file    Lack of Transportation (Non-Medical):  Not on file   Physical Activity:     Days of Exercise per Week: Not on file    Minutes of Exercise per Session: Not on file   Stress:     Feeling of Stress : Not on file   Social Connections:     Frequency of Communication with Friends and Family: Not on file    Frequency of Social Gatherings with Friends and Family: Not on file    Attends Sikhism Services: Not on file    Active Member of [address] [place] [address] or Organizations: Not on file    Attends Club or Organization Meetings: Not on file    Marital Status: Not on file   Intimate Partner Violence:     Fear of Current or Ex-Partner: Not on file    Emotionally Abused: Not on file    Physically Abused: Not on file    Sexually Abused: Not on file   Housing Stability:     Unable to Pay for Housing in the Last Year: Not on file    Number of [place] in the Last Year: Not on file    Unstable Housing in the Last Year: Not on file     Current Outpatient Medications on File Prior to Visit   Medication Sig Dispense Refill    lisinopriL (PRINIVIL, ZESTRIL) 20 mg tablet TAKE 1 TABLET BY MOUTH EVERY DAY 90 Tablet 0    metFORMIN ER (GLUCOPHAGE XR) 500 mg tablet TAKE 2 TABLETS BY MOUTH TWICE A DAY WITH MEALS 360 Tablet 0    cholecalciferol (VITAMIN D3) (2,000 UNITS /50 MCG) cap capsule Take 1 Capsule by mouth daily. 90 Capsule 3    amLODIPine (NORVASC) 5 mg tablet TAKE 1 TABLET BY MOUTH EVERY DAY 90 Tablet 0    Trulicity 1.5 WL/0.4 mL sub-q pen 0.5 ML BY SUBCUTANEOUS ROUTE EVERY SEVEN (7) DAYS. 12 Syringe 0    empagliflozin (JARDIANCE) 25 mg tablet Take 1 Tablet by mouth daily. 90 Tablet 0    rosuvastatin (CRESTOR) 10 mg tablet TAKE 1 TABLET BY MOUTH EVERY DAY AT NIGHT 90 Tab 3    multivitamin (ONE A DAY) tablet Take 1 Tab by mouth daily.  BABY ASPIRIN PO Take  by mouth. 1 tablet po daily      omega 3-dha-epa-fish oil (Fish Oil) 100-160-1,000 mg cap Take  by mouth. 1 tablet po daily      OneTouch UltraSoft Lancets misc       OneTouch Verio test strips strip        No current facility-administered medications on file prior to visit. No Known Allergies  ROS  See HPI for pertinent ROS. Objective  Visit Vitals  BP (!) 152/88 (BP 1 Location: Right upper arm, BP Patient Position: Sitting)   Pulse 84   Temp 96.9 °F (36.1 °C) (Temporal)   Resp 16   Ht 6' 2\" (1.88 m)   Wt (!) 389 lb 4 oz (176.6 kg)   SpO2 98%   BMI 49.98 kg/m²       Physical Exam  Vitals and nursing note reviewed. Constitutional:       General: He is not in acute distress. Appearance: Normal appearance. He is morbidly obese. HENT:      Head: Normocephalic. Eyes:      Extraocular Movements: Extraocular movements intact. Cardiovascular:      Rate and Rhythm: Normal rate and regular rhythm. Pulses:           Dorsalis pedis pulses are 2+ on the right side and 2+ on the left side. Posterior tibial pulses are 2+ on the right side and 2+ on the left side. Heart sounds: Normal heart sounds. Pulmonary:      Effort: Pulmonary effort is normal.      Breath sounds: Normal breath sounds. Musculoskeletal:         General: Normal range of motion. Right lower leg: No edema. Left lower leg: No edema. Right foot: Normal range of motion. Left foot: Normal range of motion.    Feet:      Right foot: Protective Sensation: 10 sites tested. 7 sites sensed. Skin integrity: Callus and dry skin present. Toenail Condition: Right toenails are normal.      Left foot:      Protective Sensation: 10 sites tested. 8 sites sensed. Skin integrity: Callus and dry skin present. Toenail Condition: Left toenails are normal.   Skin:     General: Skin is warm and dry. Neurological:      Mental Status: He is alert and oriented to person, place, and time. Psychiatric:         Mood and Affect: Mood normal.         Behavior: Behavior normal.          Assessment & Plan      ICD-10-CM ICD-9-CM    1. Essential hypertension  X30 741.6 METABOLIC PANEL, BASIC   2. Controlled type 2 diabetes mellitus with hyperglycemia, without long-term current use of insulin ([facility])  E11.65 250.80 HEMOGLOBIN A1C WITH EAG     790.29 MICROALBUMIN, UR, RAND W/ MICROALB/CREAT RATIO       DIABETES FOOT EXAM   3. Encounter for diabetic foot exam ([facility] [address]  E11.9 250.00  DIABETES FOOT EXAM   4. Morbid obesity ([facility] [address]  E66.01 278.01    5. Vaccine refused by patient  Z28.20 V64.09    6. Encounter for immunization  Z23 V03.89 ZOSTER VACC RECOMBINANT ADJUVANTED     Diagnoses and all orders for this visit:    1. Essential hypertension  BP is above goal in the office today off medication below goal on reported home readings. No medication changes. Check BP readings 1 to 2 hours after taking medication and after sitting quietly for 5 minutes with both feet flat on the floor and arm at heart level. Call if readings are consistently greater than 140/90 on either number. Increase regular exercise, limit salt intake, and stay well-hydrated. -     METABOLIC PANEL, BASIC    2. Controlled type 2 diabetes mellitus with hyperglycemia, without long-term current use of insulin ([facility])  Checking A1c today before adjusting medication. Getting up-to-date on diabetic measures as noted.   Reinforced the importance of weight loss, and dietary and exercise compliance. -     HEMOGLOBIN A1C WITH EAG  -     MICROALBUMIN, UR, RAND W/ MICROALB/CREAT RATIO  -      DIABETES FOOT EXAM    3. Encounter for diabetic foot exam ([facility]  Check feet daily for injury, calluses, and dry skin. Keep nails clean and dry. Apply lotion nightly to dry skin and calluses. -      DIABETES FOOT EXAM    4. Morbid obesity ([facility] [address]  Weight is up 9 pounds since his last visit. Continue to work on increasing regular exercise and eating a healthy diet. 5. Vaccine refused by patient  Declines flu and Covid vaccines. 6. Encounter for immunization  -     ZOSTER VACC RECOMBINANT ADJUVANTED      Follow-up and Dispositions    · Return in about 4 months (around 4/9/2022) for wellness, fasting labs, follow up, chronic conditions/meds (S[facility] #2).            Rocio Camarena, NP

## 2021-12-09 NOTE — PATIENT INSTRUCTIONS
Diabetes Foot Health: Care Instructions  Your Care Instructions     When you have diabetes, your feet need extra care and attention. Diabetes can damage the nerve endings and blood vessels in your feet, making you less likely to notice when your feet are injured. Diabetes also limits your body's ability to fight infection and get blood to areas that need it. If you get a minor foot injury, it could become an ulcer or a serious infection. With good foot care, you can prevent most of these problems. Caring for your feet can be quick and easy. Most of the care can be done when you are bathing or getting ready for bed. Follow-up care is a key part of your treatment and safety. Be sure to make and go to all appointments, and call your doctor if you are having problems. It's also a good idea to know your test results and keep a list of the medicines you take. How can you care for yourself at home? · Keep your blood sugar close to normal by watching what and how much you eat, monitoring blood sugar, taking medicines if prescribed, and getting regular exercise. · Do not smoke. Smoking affects blood flow and can make foot problems worse. If you need help quitting, talk to your doctor about stop-smoking programs and medicines. These can increase your chances of quitting for good. · Eat a diet that is low in fats. High fat intake can cause fat to build up in your blood vessels and decrease blood flow. · Inspect your feet daily for blisters, cuts, cracks, or sores. If you cannot see well, use a mirror or have someone help you. · Take care of your feet:  ? Wash your feet every day. Use warm (not hot) water. Check the water temperature with your wrists or other part of your body, not your feet. ? Dry your feet well. Pat them dry. Do not rub the skin on your feet too hard. Dry well between your toes. If the skin on your feet stays moist, bacteria or a fungus can grow, which can lead to infection. ?  Keep your skin soft. Use moisturizing skin cream to keep the skin on your feet soft and prevent calluses and cracks. But do not put the cream between your toes, and stop using any cream that causes a rash. ? Clean underneath your toenails carefully. Do not use a sharp object to clean underneath your toenails. Use the blunt end of a nail file or other rounded tool. ? Trim and file your toenails straight across to prevent ingrown toenails. Use a nail clipper, not scissors. Use an emery board to smooth the edges. · Change socks daily. Socks without seams are best, because seams often rub the feet. You can find socks for people with diabetes from specialty catalogs. · Look inside your shoes every day for things like gravel or torn linings, which could cause blisters or sores. · Buy shoes that fit well:  ? Look for shoes that have plenty of space around the toes. This helps prevent bunions and blisters. ? Try on shoes while wearing the kind of socks you will usually wear with the shoes. ? Avoid plastic shoes. They may rub your feet and cause blisters. Good shoes should be made of materials that are flexible and breathable, such as leather or cloth. ? Break in new shoes slowly by wearing them for no more than an hour a day for several days. Take extra time to check your feet for red areas, blisters, or other problems after you wear new shoes. · Do not go barefoot. Do not wear sandals, and do not wear shoes with very thin soles. Thin soles are easy to puncture. They also do not protect your feet from hot pavement or cold weather. · Have your doctor check your feet during each visit. If you have a foot problem, see your doctor. Do not try to treat an early foot problem at home. Home remedies or treatments that you can buy without a prescription (such as corn removers) can be harmful. · Always get early treatment for foot problems. A minor irritation can lead to a major problem if not properly cared for early.   When should you call for help? Call your doctor now or seek immediate medical care if:    · You have a foot sore, an ulcer or break in the skin that is not healing after 4 days, bleeding corns or calluses, or an ingrown toenail.     · You have blue or black areas, which can mean bruising or blood flow problems.     · You have peeling skin or tiny blisters between your toes or cracking or oozing of the skin.     · You have a fever for more than 24 hours and a foot sore.     · You have new numbness or tingling in your feet that does not go away after you move your feet or change positions.     · You have unexplained or unusual swelling of the foot or ankle. Watch closely for changes in your health, and be sure to contact your doctor if:    · You cannot do proper foot care. Where can you learn more? Go to http://www.gray.com/  Enter A739 in the search box to learn more about \"Diabetes Foot Health: Care Instructions. \"  Current as of: August 31, 2020               Content Version: 13.0  © 2006-2021 [employer]. Care instructions adapted under license by [employer] (which disclaims liability or warranty for this information). If you have questions about a medical condition or this instruction, always ask your healthcare professional. [address] any warranty or liability for your use of this information.

## 2021-12-09 NOTE — PROGRESS NOTES
Chief Complaint   Patient presents with    Follow-up     HTN, DM   1. Have you been to the ER, urgent care clinic since your last visit? Hospitalized since your last visit? No    2. Have you seen or consulted any other health care providers outside of the [address] [place] [address] since your last visit? Include any pap smears or colon screening.  No   3 most recent PHQ Screens 12/9/2021   Little interest or pleasure in doing things Not at all   Feeling down, depressed, irritable, or hopeless Not at all   Total Score PHQ 2 0     Visit Vitals  BP (!) 152/88 (BP 1 Location: Right upper arm, BP Patient Position: Sitting)   Pulse 84   Temp 96.9 °F (36.1 °C) (Temporal)   Resp 16   Ht 6' 2\" (1.88 m)   Wt (!) 389 lb 4 oz (176.6 kg)   SpO2 98%   BMI 49.98 kg/m²

## 2021-12-10 LAB
ALBUMIN/CREAT UR: 5 MG/G CREAT (ref 0–29)
BUN SERPL-MCNC: 16 MG/DL (ref 6–24)
BUN/CREAT SERPL: 24 (ref 9–20)
CALCIUM SERPL-MCNC: 9 MG/DL (ref 8.7–10.2)
CHLORIDE SERPL-SCNC: 106 MMOL/L (ref 96–106)
CO2 SERPL-SCNC: 22 MMOL/L (ref 20–29)
CREAT SERPL-MCNC: 0.66 MG/DL (ref 0.76–1.27)
CREAT UR-MCNC: 92.4 MG/DL
EST. AVERAGE GLUCOSE BLD GHB EST-MCNC: 192 MG/DL
GLUCOSE SERPL-MCNC: 203 MG/DL (ref 65–99)
HBA1C MFR BLD: 8.3 % (ref 4.8–5.6)
MICROALBUMIN UR-MCNC: 5 UG/ML
POTASSIUM SERPL-SCNC: 4.6 MMOL/L (ref 3.5–5.2)
SODIUM SERPL-SCNC: 140 MMOL/L (ref 134–144)

## 2021-12-11 DIAGNOSIS — E11.65 TYPE 2 DIABETES MELLITUS WITH HYPERGLYCEMIA, WITHOUT LONG-TERM CURRENT USE OF INSULIN (HCC): ICD-10-CM

## 2021-12-11 DIAGNOSIS — E11.65 CONTROLLED TYPE 2 DIABETES MELLITUS WITH HYPERGLYCEMIA, WITHOUT LONG-TERM CURRENT USE OF INSULIN (HCC): ICD-10-CM

## 2021-12-11 RX ORDER — DULAGLUTIDE 3 MG/.5ML
3 INJECTION, SOLUTION SUBCUTANEOUS
Qty: 2 EACH | Refills: 0 | Status: SHIPPED | OUTPATIENT
Start: 2021-12-11 | End: 2022-01-06 | Stop reason: SDUPTHER

## 2021-12-27 ENCOUNTER — TELEPHONE (OUTPATIENT)
Dept: FAMILY MEDICINE CLINIC | Age: 51
End: 2021-12-27

## 2021-12-27 NOTE — TELEPHONE ENCOUNTER
Patient states his son tested positive for Covid today and wants to know if he should also get tested since he is showing no signs. Please advise.

## 2022-01-05 DIAGNOSIS — I10 ESSENTIAL HYPERTENSION: ICD-10-CM

## 2022-01-05 RX ORDER — AMLODIPINE BESYLATE 5 MG/1
TABLET ORAL
Qty: 90 TABLET | Refills: 0 | Status: SHIPPED | OUTPATIENT
Start: 2022-01-05 | End: 2022-04-04

## 2022-01-06 DIAGNOSIS — E11.65 CONTROLLED TYPE 2 DIABETES MELLITUS WITH HYPERGLYCEMIA, WITHOUT LONG-TERM CURRENT USE OF INSULIN (HCC): Primary | ICD-10-CM

## 2022-01-06 RX ORDER — DULAGLUTIDE 3 MG/.5ML
0.5 INJECTION, SOLUTION SUBCUTANEOUS
Qty: 6 EACH | Refills: 0 | Status: SHIPPED | OUTPATIENT
Start: 2022-01-06 | End: 2022-04-13 | Stop reason: DRUGHIGH

## 2022-01-24 DIAGNOSIS — I10 ESSENTIAL HYPERTENSION: ICD-10-CM

## 2022-01-24 RX ORDER — LISINOPRIL 20 MG/1
TABLET ORAL
Qty: 90 TABLET | Refills: 0 | Status: SHIPPED | OUTPATIENT
Start: 2022-01-24 | End: 2022-01-26

## 2022-01-26 DIAGNOSIS — I10 ESSENTIAL HYPERTENSION: ICD-10-CM

## 2022-01-26 RX ORDER — LISINOPRIL 20 MG/1
TABLET ORAL
Qty: 90 TABLET | Refills: 0 | Status: SHIPPED | OUTPATIENT
Start: 2022-01-26 | End: 2022-05-02

## 2022-03-02 DIAGNOSIS — E11.65 CONTROLLED TYPE 2 DIABETES MELLITUS WITH HYPERGLYCEMIA, WITHOUT LONG-TERM CURRENT USE OF INSULIN (HCC): ICD-10-CM

## 2022-03-02 RX ORDER — EMPAGLIFLOZIN 25 MG/1
TABLET, FILM COATED ORAL
Qty: 90 TABLET | Refills: 0 | Status: SHIPPED | OUTPATIENT
Start: 2022-03-02 | End: 2022-06-14

## 2022-03-11 ENCOUNTER — TELEPHONE (OUTPATIENT)
Dept: ENT CLINIC | Age: 52
End: 2022-03-11

## 2022-03-11 DIAGNOSIS — R42 DIZZINESS: Primary | ICD-10-CM

## 2022-03-11 NOTE — TELEPHONE ENCOUNTER
[address] [facility] [address] called stating that they needed a new order sent over to schedule a MRI that was ordered by Dr. Shereen Patel.  The best number to reach them is [phone]

## 2022-03-18 ENCOUNTER — HOSPITAL ENCOUNTER (OUTPATIENT)
Dept: MRI IMAGING | Age: 52
Discharge: HOME | End: 2022-03-18
Attending: OTOLARYNGOLOGY
Payer: COMMERCIAL

## 2022-03-18 DIAGNOSIS — R42 DIZZINESS: ICD-10-CM

## 2022-03-18 PROCEDURE — 70553 MRI BRAIN STEM W/O & W/DYE: CPT

## 2022-03-18 PROCEDURE — A9576 INJ PROHANCE MULTIPACK: HCPCS | Performed by: OTOLARYNGOLOGY

## 2022-03-18 PROCEDURE — 74011250636 HC RX REV CODE- 250/636: Performed by: OTOLARYNGOLOGY

## 2022-03-18 RX ADMIN — GADOTERIDOL 20 ML: 279.3 INJECTION, SOLUTION INTRAVENOUS at 17:39

## 2022-03-19 PROBLEM — I10 ESSENTIAL HYPERTENSION: Status: ACTIVE | Noted: 2020-08-07

## 2022-03-19 PROBLEM — E78.5 HYPERLIPIDEMIA: Status: ACTIVE | Noted: 2020-08-07

## 2022-04-03 DIAGNOSIS — I10 ESSENTIAL HYPERTENSION: ICD-10-CM

## 2022-04-04 RX ORDER — AMLODIPINE BESYLATE 5 MG/1
TABLET ORAL
Qty: 90 TABLET | Refills: 0 | Status: SHIPPED | OUTPATIENT
Start: 2022-04-04 | End: 2022-07-15

## 2022-04-08 ENCOUNTER — OFFICE VISIT (OUTPATIENT)
Dept: FAMILY MEDICINE CLINIC | Age: 52
End: 2022-04-08
Payer: COMMERCIAL

## 2022-04-08 VITALS
RESPIRATION RATE: 16 BRPM | TEMPERATURE: 97.4 F | DIASTOLIC BLOOD PRESSURE: 88 MMHG | HEIGHT: 74 IN | HEART RATE: 82 BPM | OXYGEN SATURATION: 97 % | BODY MASS INDEX: 40.43 KG/M2 | WEIGHT: 315 LBS | SYSTOLIC BLOOD PRESSURE: 138 MMHG

## 2022-04-08 DIAGNOSIS — I10 ESSENTIAL HYPERTENSION: ICD-10-CM

## 2022-04-08 DIAGNOSIS — E11.65 TYPE 2 DIABETES MELLITUS WITH HYPERGLYCEMIA, WITHOUT LONG-TERM CURRENT USE OF INSULIN (HCC): ICD-10-CM

## 2022-04-08 DIAGNOSIS — Z00.00 WELLNESS EXAMINATION: Primary | ICD-10-CM

## 2022-04-08 DIAGNOSIS — Z13.220 SCREENING FOR HYPERLIPIDEMIA: ICD-10-CM

## 2022-04-08 DIAGNOSIS — Z28.20 VACCINE REFUSED BY PATIENT: ICD-10-CM

## 2022-04-08 DIAGNOSIS — E66.01 MORBID OBESITY (HCC): ICD-10-CM

## 2022-04-08 DIAGNOSIS — E78.00 PURE HYPERCHOLESTEROLEMIA: ICD-10-CM

## 2022-04-08 DIAGNOSIS — Z23 ENCOUNTER FOR IMMUNIZATION: ICD-10-CM

## 2022-04-08 DIAGNOSIS — Z12.11 COLON CANCER SCREENING: ICD-10-CM

## 2022-04-08 PROCEDURE — 99396 PREV VISIT EST AGE 40-64: CPT | Performed by: NURSE PRACTITIONER

## 2022-04-08 PROCEDURE — 90471 IMMUNIZATION ADMIN: CPT | Performed by: NURSE PRACTITIONER

## 2022-04-08 PROCEDURE — 90750 HZV VACC RECOMBINANT IM: CPT | Performed by: NURSE PRACTITIONER

## 2022-04-08 PROCEDURE — 99214 OFFICE O/P EST MOD 30 MIN: CPT | Performed by: NURSE PRACTITIONER

## 2022-04-08 NOTE — PROGRESS NOTES
Subjective  Chief Complaint   Patient presents with    Annual Wellness Visit    Follow Up Chronic Condition    Other     shingrix #2     HPI:  Carie Juares is a 47 y.o. male. Patient presents for, fasting labs, and management of hypertension, hyperlipidemia, type 2 diabetes, and morbid obesity. Understands this is considered two appointments and there may be a copay for the chronic disease management portion of the visit. For chronic disease management:  Medications reviewed, taking as prescribed with no known side effects. Bariatric surgery scheduled 6/14/2022.     Management of HTN-  Current meds: Amlodipine 5 mg, lisinopril 20 mg daily  Home BP readings: does not check, has a cuff    Management of T2DM-  Current meds: Trulicity 3 mg weekly, Jardiance 25 mg daily and Metformin 1000 mg twice daily  Compliant with medication: yes  Last A1c:   Lab Results   Component Value Date/Time    Hemoglobin A1c 8.3 (H) 12/09/2021 10:40 AM    Hemoglobin A1c, External 8.7 11/11/2019 12:00 AM      Home glucose monitoring range: does not check, has supplies  Hypoglycemia: denies  Increased thirst, appetite, and urination: denies  Vision changes, last eye exam: denies, within 6 months- Dr. Ania Thurman regularly, numbness and injury: yes  Last foot exam: 12/2021  Compliant with statin: Rosuvastatin  Compliant with ACEI: Lisinopril  Last microalbumin:   Lab Results   Component Value Date/Time    Microalb/Creat ratio (ug/mg creat.) 5 12/09/2021 10:40 AM      Pneumovax: complete  Compliant with diet: fair  Complaint with exercise: walks ocassionally  Personal h/o CV disease including MI, stroke, and PVD: denies    For wellness:  Immunizations:  Flu: due next fall  COVID: declines  Tetanus: 2018  Shingrix: second dose today  Pneumovax 23: complete    HCV screening: complete  Colon cancer screening: scheduled today  Smoking status: never    Moods: at goal  PHQ2: 0/2  Diet: fair  Exercise: as above  Vision exams: annual  Dental exams: overdue    Past Medical History:   Diagnosis Date    Diabetes ([facility]     Hypercholesterolemia     Hypertension      Family History   Problem Relation Age of Onset    Diabetes Mother     Hypertension Mother     Breast Cancer Mother     Diabetes Maternal Grandmother     Breast Cancer Maternal Grandmother     Diabetes Paternal Grandfather      Social History     Socioeconomic History    Marital status:      Spouse name: Not on file    Number of children: Not on file    Years of education: Not on file    Highest education level: Not on file   Occupational History    Not on file   Tobacco Use    Smoking status: Never Smoker    Smokeless tobacco: Never Used   Vaping Use    Vaping Use: Never used   Substance and Sexual Activity    Alcohol use: Never    Drug use: Never    Sexual activity: Yes   Other Topics Concern    Not on file   Social History Narrative    Not on file     Social Determinants of Health     Financial Resource Strain:     Difficulty of Paying Living Expenses: Not on file   Food Insecurity:     Worried About [address] in the Last Year: Not on file    [address] in the Last Year: Not on file   Transportation Needs:     Lack of Transportation (Medical): Not on file    Lack of Transportation (Non-Medical):  Not on file   Physical Activity:     Days of Exercise per Week: Not on file    Minutes of Exercise per Session: Not on file   Stress:     Feeling of Stress : Not on file   Social Connections:     Frequency of Communication with Friends and Family: Not on file    Frequency of Social Gatherings with Friends and Family: Not on file    Attends [employer] Services: Not on file    Active Member of Clubs or Organizations: Not on file    Attends Club or Organization Meetings: Not on file    Marital Status: Not on file   Intimate Partner Violence:     Fear of Current or Ex-Partner: Not on file    Emotionally Abused: Not on file   Talisha Acosta Physically Abused: Not on file    Sexually Abused: Not on file   Housing Stability:     Unable to Pay for Housing in the Last Year: Not on file    Number of Places Lived in the Last Year: Not on file    Unstable Housing in the Last Year: Not on file     Current Outpatient Medications on File Prior to Visit   Medication Sig Dispense Refill    amLODIPine (NORVASC) 5 mg tablet TAKE 1 TABLET BY MOUTH EVERY DAY 90 Tablet 0    Jardiance 25 mg tablet TAKE 1 TABLET BY MOUTH EVERY DAY 90 Tablet 0    rosuvastatin (CRESTOR) 10 mg tablet TAKE 1 TABLET BY MOUTH EVERY DAY AT NIGHT 90 Tablet 0    lisinopriL (PRINIVIL, ZESTRIL) 20 mg tablet TAKE 1 TABLET BY MOUTH EVERY DAY 90 Tablet 0    dulaglutide (Trulicity) 3 XZ/5.9 mL pnij 0.5 mL by SubCUTAneous route every seven (7) days. 6 Each 0    metFORMIN ER (GLUCOPHAGE XR) 500 mg tablet TAKE 2 TABLETS BY MOUTH TWICE A DAY WITH MEALS 360 Tablet 0    cholecalciferol (VITAMIN D3) (2,000 UNITS /50 MCG) cap capsule Take 1 Capsule by mouth daily. 90 Capsule 3    multivitamin (ONE A DAY) tablet Take 1 Tab by mouth daily.  BABY ASPIRIN PO Take  by mouth. 1 tablet po daily      omega 3-dha-epa-fish oil (Fish Oil) 100-160-1,000 mg cap Take  by mouth. 1 tablet po daily      OneTouch UltraSoft Lancets misc       OneTouch Verio test strips strip        No current facility-administered medications on file prior to visit. No Known Allergies  Review of Systems   Constitutional: Positive for weight loss (intentional). Negative for chills and fever. HENT: Negative for congestion, ear pain, hearing loss, sinus pain and sore throat. Denies difficulty swallowing. Eyes: Negative for blurred vision. Respiratory: Negative for cough, shortness of breath and wheezing. Cardiovascular: Negative for chest pain, palpitations and leg swelling. Gastrointestinal: Negative for abdominal pain, constipation, diarrhea and heartburn.    Genitourinary: Negative for dysuria. Musculoskeletal: Negative for joint pain and myalgias. Neurological: Negative for dizziness, tingling, weakness and headaches. Psychiatric/Behavioral: Negative for depression. The patient is not nervous/anxious. Objective  Visit Vitals  /88 (BP 1 Location: Right upper arm, BP Patient Position: Sitting)   Pulse 82   Temp 97.4 °F (36.3 °C) (Temporal)   Resp 16   Ht 6' 2\" (1.88 m)   Wt (!) 375 lb (170.1 kg)   SpO2 97%   BMI 48.15 kg/m²       Physical Exam  Vitals and nursing note reviewed. Constitutional:       General: He is not in acute distress. Appearance: Normal appearance. He is morbidly obese. HENT:      Head: Normocephalic. Eyes:      Extraocular Movements: Extraocular movements intact. Neck:      Thyroid: No thyroid mass, thyromegaly or thyroid tenderness. Cardiovascular:      Rate and Rhythm: Normal rate and regular rhythm. Heart sounds: Normal heart sounds. Pulmonary:      Effort: Pulmonary effort is normal.      Breath sounds: Normal breath sounds. Chest:   Breasts:      Right: No supraclavicular adenopathy. Left: No supraclavicular adenopathy. Abdominal:      General: Bowel sounds are normal.      Palpations: Abdomen is soft. There is no mass. Tenderness: There is no abdominal tenderness. Comments: Limited by habitus   Musculoskeletal:         General: Normal range of motion. Cervical back: Normal range of motion and neck supple. Right lower leg: No edema. Left lower leg: No edema. Lymphadenopathy:      Cervical: No cervical adenopathy. Upper Body:      Right upper body: No supraclavicular adenopathy. Left upper body: No supraclavicular adenopathy. Skin:     General: Skin is warm and dry. Neurological:      General: No focal deficit present. Mental Status: He is alert and oriented to person, place, and time.    Psychiatric:         Mood and Affect: Mood normal.         Behavior: Behavior normal. Thought Content: Thought content normal.         Judgment: Judgment normal.          Assessment & Plan      ICD-10-CM ICD-9-CM    1. Wellness examination  Z00.00 V70.0    2. Screening for hyperlipidemia  Z13.220 V77.91 CBC WITH AUTOMATED DIFF      LIPID PANEL      METABOLIC PANEL, COMPREHENSIVE   3. [facility] ([facility]  E66.01 278.01    4. Encounter for immunization  Z23 V03.89 ZOSTER VACC RECOMBINANT ADJUVANTED   5. Vaccine refused by patient  Z28.20 V64.09    6. Colon cancer screening  Z12.11 V76.51    7. Essential hypertension  K21 825.7 METABOLIC PANEL, COMPREHENSIVE   8. Pure hypercholesterolemia  E78.00 272.0 CBC WITH AUTOMATED DIFF      LIPID PANEL   9. Type 2 diabetes mellitus with hyperglycemia, without long-term current use of insulin (HCC)  E11.65 250.00 HEMOGLOBIN A1C WITH EAG     790.29      Diagnoses and all orders for this visit:    1. Wellness examination  We are getting patient up-to-date on preventative measures as listed. 2. Screening for hyperlipidemia  -     CBC WITH AUTOMATED DIFF  -     LIPID PANEL  -     METABOLIC PANEL, COMPREHENSIVE    3. Morbid obesity ([facility]  Weight is down approximately 14 pounds since previous visit. Scheduled for bariatric surgery in June. 4. Encounter for immunization  -     ZOSTER VACC RECOMBINANT ADJUVANTED    5. Vaccine refused by patient  Declines COVID vaccines. 6. Colon cancer screening  Colonoscopy scheduled today. 7. Essential hypertension  BP is below goal in the office today. Reinforced the importance of home BP monitoring especially following bariatric surgery. Encouraged to check at least weekly and more often with weight loss. Check BP readings 1 to 2 hours after taking medication and after sitting quietly for 5 minutes with both feet flat on the floor and arm at heart level. Call if readings are consistently greater than 140/90 on either number. Increase regular exercise, limit salt intake, and stay well-hydrated.    -     METABOLIC PANEL, COMPREHENSIVE    8. Pure hypercholesterolemia  Checking annual labs. Continue rosuvastatin daily.  -     CBC WITH AUTOMATED DIFF  -     LIPID PANEL    9. Type 2 diabetes mellitus with hyperglycemia, without long-term current use of insulin ([facility])  Checking A1c today before adjusting medication. We discussed he will benefit from freestyle miranda for continuous glucose monitoring following bariatric surgery. He will call back following surgery for an order. Increase regular exercise and limit sugar/carb intake. We will request most recent eye exam.  -     HEMOGLOBIN A1C WITH EAG      Follow-up and Dispositions    · Return in about 4 months (around 8/8/2022) for follow up, chronic conditions/meds, nonfasting.            Rylie Rehman NP

## 2022-04-08 NOTE — PROGRESS NOTES
Chief Complaint   Patient presents with    Annual Wellness Visit    Follow Up Chronic Condition    Other     shingrix #2   1. Have you been to the ER, urgent care clinic since your last visit? Hospitalized since your last visit? No    2. Have you seen or consulted any other health care providers outside of the [address] [place] [address] since your last visit? Include any pap smears or colon screening.  No   Visit Vitals  /88 (BP 1 Location: Right upper arm, BP Patient Position: Sitting)   Pulse 82   Temp 97.4 °F (36.3 °C) (Temporal)   Resp 16   Ht 6' 2\" (1.88 m)   Wt (!) 375 lb (170.1 kg)   SpO2 97%   BMI 48.15 kg/m²     3 most recent PHQ Screens 4/8/2022   Little interest or pleasure in doing things Not at all   Feeling down, depressed, irritable, or hopeless Not at all   Total Score PHQ 2 0

## 2022-04-09 LAB
ALBUMIN SERPL-MCNC: 4.7 G/DL (ref 3.8–4.9)
ALBUMIN/GLOB SERPL: 2 {RATIO} (ref 1.2–2.2)
ALP SERPL-CCNC: 74 IU/L (ref 44–121)
ALT SERPL-CCNC: 36 IU/L (ref 0–44)
AST SERPL-CCNC: 29 IU/L (ref 0–40)
BASOPHILS # BLD AUTO: 0.1 X10E3/UL (ref 0–0.2)
BASOPHILS NFR BLD AUTO: 1 %
BILIRUB SERPL-MCNC: 0.7 MG/DL (ref 0–1.2)
BUN SERPL-MCNC: 12 MG/DL (ref 6–24)
BUN/CREAT SERPL: 12 (ref 9–20)
CALCIUM SERPL-MCNC: 9.7 MG/DL (ref 8.7–10.2)
CHLORIDE SERPL-SCNC: 100 MMOL/L (ref 96–106)
CHOLEST SERPL-MCNC: 105 MG/DL (ref 100–199)
CO2 SERPL-SCNC: 22 MMOL/L (ref 20–29)
CREAT SERPL-MCNC: 0.98 MG/DL (ref 0.76–1.27)
EGFR: 93 ML/MIN/1.73
EOSINOPHIL # BLD AUTO: 0.2 X10E3/UL (ref 0–0.4)
EOSINOPHIL NFR BLD AUTO: 2 %
ERYTHROCYTE [DISTWIDTH] IN BLOOD BY AUTOMATED COUNT: 14.3 % (ref 11.6–15.4)
EST. AVERAGE GLUCOSE BLD GHB EST-MCNC: 171 MG/DL
GLOBULIN SER CALC-MCNC: 2.4 G/DL (ref 1.5–4.5)
GLUCOSE SERPL-MCNC: 147 MG/DL (ref 65–99)
HBA1C MFR BLD: 7.6 % (ref 4.8–5.6)
HCT VFR BLD AUTO: 43.8 % (ref 37.5–51)
HDLC SERPL-MCNC: 35 MG/DL
HGB BLD-MCNC: 14.2 G/DL (ref 13–17.7)
IMM GRANULOCYTES # BLD AUTO: 0 X10E3/UL (ref 0–0.1)
IMM GRANULOCYTES NFR BLD AUTO: 1 %
LDLC SERPL CALC-MCNC: 36 MG/DL (ref 0–99)
LYMPHOCYTES # BLD AUTO: 2 X10E3/UL (ref 0.7–3.1)
LYMPHOCYTES NFR BLD AUTO: 24 %
MCH RBC QN AUTO: 27.6 PG (ref 26.6–33)
MCHC RBC AUTO-ENTMCNC: 32.4 G/DL (ref 31.5–35.7)
MCV RBC AUTO: 85 FL (ref 79–97)
MONOCYTES # BLD AUTO: 0.6 X10E3/UL (ref 0.1–0.9)
MONOCYTES NFR BLD AUTO: 8 %
NEUTROPHILS # BLD AUTO: 5.4 X10E3/UL (ref 1.4–7)
NEUTROPHILS NFR BLD AUTO: 64 %
PLATELET # BLD AUTO: 244 X10E3/UL (ref 150–450)
POTASSIUM SERPL-SCNC: 4.7 MMOL/L (ref 3.5–5.2)
PROT SERPL-MCNC: 7.1 G/DL (ref 6–8.5)
RBC # BLD AUTO: 5.14 X10E6/UL (ref 4.14–5.8)
SODIUM SERPL-SCNC: 141 MMOL/L (ref 134–144)
TRIGL SERPL-MCNC: 213 MG/DL (ref 0–149)
VLDLC SERPL CALC-MCNC: 34 MG/DL (ref 5–40)
WBC # BLD AUTO: 8.3 X10E3/UL (ref 3.4–10.8)

## 2022-04-13 ENCOUNTER — PATIENT MESSAGE (OUTPATIENT)
Dept: FAMILY MEDICINE CLINIC | Age: 52
End: 2022-04-13

## 2022-04-13 RX ORDER — DULAGLUTIDE 4.5 MG/.5ML
4.5 INJECTION, SOLUTION SUBCUTANEOUS
Qty: 6 EACH | Refills: 1 | Status: SHIPPED | OUTPATIENT
Start: 2022-04-13 | End: 2022-07-05 | Stop reason: ALTCHOICE

## 2022-04-13 NOTE — TELEPHONE ENCOUNTER
From: Dio Villalba  To: Elmer Mcgarry NP  Sent: 4/13/2022 1:31 PM EDT  Subject: Dexter Ramirez,    This is part of a message I received from you with my test results. \"Glucose and A1c remain consistent with type 2 diabetes. A1c has improved since it was last checked but remains above goal. I would like to increase Trulicity to 4 mg weekly with the next refill. \"    I am actually about to go  a refill of Trulicity, but I believe the pharmacist said 3 mg when I spoke with her on the phone two or three days ago. Will you send the new script for 4 mg over to [facility] for me?     Thanks,  Kira Mejía

## 2022-05-24 ENCOUNTER — PATIENT MESSAGE (OUTPATIENT)
Dept: FAMILY MEDICINE CLINIC | Age: 52
End: 2022-05-24

## 2022-05-24 DIAGNOSIS — E11.65 CONTROLLED TYPE 2 DIABETES MELLITUS WITH HYPERGLYCEMIA, WITHOUT LONG-TERM CURRENT USE OF INSULIN (HCC): ICD-10-CM

## 2022-05-24 DIAGNOSIS — E78.00 HIGH CHOLESTEROL: ICD-10-CM

## 2022-05-24 RX ORDER — METFORMIN HYDROCHLORIDE 500 MG/1
TABLET, EXTENDED RELEASE ORAL
Qty: 360 TABLET | Refills: 0 | Status: SHIPPED | OUTPATIENT
Start: 2022-05-24 | End: 2022-07-05 | Stop reason: ALTCHOICE

## 2022-05-24 RX ORDER — ROSUVASTATIN CALCIUM 10 MG/1
TABLET, COATED ORAL
Qty: 90 TABLET | Refills: 3 | Status: SHIPPED | OUTPATIENT
Start: 2022-05-24 | End: 2022-09-28 | Stop reason: ALTCHOICE

## 2022-05-24 NOTE — TELEPHONE ENCOUNTER
From: Sadie Gan  To: Solis Kong NP  Sent: 5/24/2022 2:05 PM EDT  Subject: Metformin    Good afternoon, Agustin Meeks,  I have tried twice through the pharmacy over the past two to three weeks to get this refilled. I am not sure what the issue is, but it hasn't been refilled and I just ran out of my Metformin. Can you put in a refill for my Metformin? Also, on a related matter, with my surgery coming up on June 14th and having to start my liver shrinking diet on June 1, do I need to make any adjustments to my medications prior to my surgery?     Thanks,  Lyric Shaw

## 2022-05-31 ENCOUNTER — OFFICE VISIT (OUTPATIENT)
Dept: ENT CLINIC | Age: 52
End: 2022-05-31
Payer: COMMERCIAL

## 2022-05-31 VITALS
BODY MASS INDEX: 40.43 KG/M2 | HEIGHT: 74 IN | HEART RATE: 87 BPM | DIASTOLIC BLOOD PRESSURE: 80 MMHG | OXYGEN SATURATION: 97 % | WEIGHT: 315 LBS | RESPIRATION RATE: 20 BRPM | SYSTOLIC BLOOD PRESSURE: 140 MMHG

## 2022-05-31 DIAGNOSIS — R26.89 IMBALANCE: Primary | ICD-10-CM

## 2022-05-31 PROCEDURE — 99213 OFFICE O/P EST LOW 20 MIN: CPT | Performed by: OTOLARYNGOLOGY

## 2022-05-31 NOTE — PROGRESS NOTES
Chief Complaint   Patient presents with    Follow-up     balance issues       Visit Vitals  BP (!) 140/80   Pulse 87   Resp 20   Ht 6' 2\" (1.88 m)   Wt (!) 381 lb 9.6 oz (173.1 kg)   SpO2 97%   BMI 48.99 kg/m²

## 2022-05-31 NOTE — LETTER
5/31/2022    Patient: Sadie Victor   YOB: 1970   Date of Visit: 5/31/2022     Sanket Coto NP  [address] [place] [address]  [place] [place]    Dear Sanket Coto NP,      Thank you for referring Mr. Bobbi Livingston to [facility] [address] for evaluation. My notes for this consultation are attached. If you have questions, please do not hesitate to call me. I look forward to following your patient along with you.       Sincerely,    Wilmar Garcia MD

## 2022-05-31 NOTE — PROGRESS NOTES
Otolaryngology-Head and Neck Surgery  Follow Up Patient Visit     Patient: Faraz Dunn  YOB: 1970  MRN: [id number]  Date of Service:  5/31/2022    Chief Complaint:  Chronic imbalance     Interval history:  Continues to have imbalance  No true dizziness, more of just unsteadiness     History of Present Illness: Faraz Dunn is a 47y.o. year old male who presents today for discussion of chronic dizziness. He notes imbalance over the last few years - stable, no better or worse. Constant, mostly when he gets up    Can walk without concerns for falls    Going up stairs is difficult, needs to hold on the railing    Has to take it slow when getting out of bed    Denies room spinning  No movement feeling     No falls    Driving ok    Not hearing as well, both ears feel similar    Past Medical History:  Past Medical History:   Diagnosis Date    Diabetes ([facility] [address]     Hypercholesterolemia     Hypertension        Past Surgical History:   Past Surgical History:   Procedure Laterality Date    HX WISDOM TEETH EXTRACTION         Medications:   Current Outpatient Medications   Medication Instructions    amLODIPine (NORVASC) 5 mg tablet TAKE 1 TABLET BY MOUTH EVERY DAY    BABY ASPIRIN PO Oral, 1 tablet po daily    cholecalciferol (VITAMIN D3) 2,000 Units, Oral, DAILY    Jardiance 25 mg tablet TAKE 1 TABLET BY MOUTH EVERY DAY    lisinopriL (PRINIVIL, ZESTRIL) 20 mg tablet TAKE 1 TABLET BY MOUTH EVERY DAY    metFORMIN ER (GLUCOPHAGE XR) 500 mg tablet TAKE 2 TABLETS BY MOUTH TWICE A DAY WITH MEALS    multivitamin (ONE A DAY) tablet 1 Tablet, Oral, DAILY    omega 3-dha-epa-fish oil (Fish Oil) 100-160-1,000 mg cap Oral, 1 tablet po daily    OneTouch UltraSoft Lancets misc No dose, route, or frequency recorded.  OneTouch Verio test strips strip No dose, route, or frequency recorded.     rosuvastatin (CRESTOR) 10 mg tablet TAKE 1 TABLET BY MOUTH EVERY DAY AT NIGHT    Trulicity 4.5 mg, SubCUTAneous, EVERY 7 DAYS       Allergies:   No Known Allergies    Social History:   Social History     Tobacco Use    Smoking status: Never Smoker    Smokeless tobacco: Never Used   Vaping Use    Vaping Use: Never used   Substance Use Topics    Alcohol use: Never    Drug use: Never        Family History:  Family History   Problem Relation Age of Onset    Diabetes Mother     Hypertension Mother     Breast Cancer Mother     Diabetes Maternal Grandmother     Breast Cancer Maternal Grandmother     Diabetes Paternal Grandfather        Review of Systems:    Consitutional: denies fever, excessive weight gain or loss. Eyes: denies diplopia, eye pain. Integumentary: denies new concerning skin lesions. Ears, Nose, Mouth, Throat: denies except as per HPI. Endocrine: denies hot or cold intolerance, increased thirst.  Respiratory: denies cough, hemoptysis, wheezing  Gastrointestinal: denies trouble swallowing, nausea, emesis, regurgitation  Musculoskeletal: denies muscle weakness or wasting  Cardiovascular: denies chest pain, shortness of breath  Neurologic: denies seizures, numbness or tingling, syncope  Hematologic: denies easy bleeding or bruising    Physical Examination:   Vitals:    05/31/22 1148   BP: (!) 140/80   Pulse: 87   Resp: 20   Height: 6' 2\" (1.88 m)   Weight: (!) 381 lb 9.6 oz (173.1 kg)   SpO2: 97%        General: Comfortable, pleasant, appears stated age  Voice: Strong, speaking in full sentences, no stridor    Face: No masses or lesions, facial strength symmetric   Ears: External ears unremarkable. Bilateral ear canal clear. Tympanic membrane clear and intact, with visible landmarks. Clear middle ear space  Nose: External nose unremarkable. Dorsum midline. Anterior rhinoscopy demonstrates no lesions. Septum midline. Turbinates without hypertrophy. Oral Cavity / Oropharynx: No trismus. Mucosa pink and moist. No lesions. Tongue is midline and mobile. Palate elevates symmetrically. Uvula midline.  Tonsils unremarkable. Base of tongue soft. Floor of mouth soft. Neck: Supple. No adenopathy. Thyroid unremarkable. Palpable laryngeal landmarks. Full neck range of motion   Neurologic: CN II - XI intact. Normal gait. Pineda midline, AC > BC. Susan Allen deferred today due to chair restriction. No spontaneous, gaze induced, head shake nystagmus. MRI brain/IAC    IMPRESSION  Normal MRI of the brain.     Normal MR evaluation of the skull base. No intracranial mass, hemorrhage or evidence of acute infarction.         Assessment and Plan:   1. Imbalance  - MRI brain / IAC benign   - Discussed options for next steps  - Imbalance often multifactorial  - May be some benefit post weight loss surgery  - Will plan to arrange VNG  - Discussed options of balance PT and/or neurology eval in future which he will consider   - I will call or send message re: VNG results    The patient was instructed to return to clinic if no improvement or progression of symptoms. Signs to watch out for reviewed.       MD Parvez Spencer [facility]  [address] [place] [address]  Office Phone: [phone]

## 2022-06-14 DIAGNOSIS — E11.65 CONTROLLED TYPE 2 DIABETES MELLITUS WITH HYPERGLYCEMIA, WITHOUT LONG-TERM CURRENT USE OF INSULIN (HCC): ICD-10-CM

## 2022-06-14 RX ORDER — EMPAGLIFLOZIN 25 MG/1
TABLET, FILM COATED ORAL
Qty: 90 TABLET | Refills: 0 | Status: SHIPPED | OUTPATIENT
Start: 2022-06-14 | End: 2022-07-05 | Stop reason: ALTCHOICE

## 2022-06-24 ENCOUNTER — OFFICE VISIT (OUTPATIENT)
Dept: ENT CLINIC | Age: 52
End: 2022-06-24
Payer: COMMERCIAL

## 2022-06-24 DIAGNOSIS — R42 DIZZINESS: Primary | ICD-10-CM

## 2022-06-24 PROCEDURE — 92540 BASIC VESTIBULAR EVALUATION: CPT | Performed by: AUDIOLOGIST

## 2022-06-24 PROCEDURE — 92537 CALORIC VSTBLR TEST W/REC: CPT | Performed by: AUDIOLOGIST

## 2022-06-24 NOTE — Clinical Note
Saw pt today for VNG - unremarkable.  He wanted to let you know he'll be in surgery on Wednesday and wanted to ask if you'd be able to call him with results either Monday or Tuesday

## 2022-06-24 NOTE — PROGRESS NOTES
Patient name: Shyann Spann   : 1970   MRN: [id number]   Appointment type: VNG    Patient is a very pleasant 47 y.o. male  referred by Dr. Dara Cazares for videonystagmography (VNG) testing. Patient reports vertigo described as imbalance and unsteadiness that occurs with motion. Patient's most recent hearing test was 2021. Results at the time of testing indicated normal/borderline normal hearing in both ears. Patient has no reported history of noise exposure or head trauma and denies any ear pain or ear fullness/pressure. Patient denies history of stroke, seizure, falls, ear surgery, ear infection, tympanic membrane perforation, head trauma, glaucoma, neuropathy, or diabetes. Patient denied taking any vertigo-suppressing medications or consuming alcohol for at least 48 hours prior to testing.      Otoscopy: canals clear, tympanic membrane intact bilaterally    VNG RESULTS  Horizontal saccades  Average rightward peak velocity 465 deg/sec. within normal limits  Average rightward accuracy 96% within normal limits  Average rightward latency 200 ms within normal limits    Average leftward peak velocity -461 deg/sec within normal limits  Average leftward accuracy 94% within normal limits  Average leftward latency 180 ms within normal limits    Horizontal Tracking  Average rightward gain 1.01 within normal limits  Average leftward gain 0.91 within normal limits    OPK  40 deg/sec to the right - Peak SPV 48 deg/sec within normal limits  40 deg/sec to the left - Peak SPV -49 deg/sec within normal limits    Gaze Testing  Center - Peak SPV 0 deg/sec within normal limits  Right - Peak SPV 0 deg/sec within normal limits  Left - Peak SPV 0 deg/sec within normal limits  Up - Peak SPV 0 deg/sec within normal limits  Down - Peak SPV 0 deg/sec within normal limits    Positional Testing (Vision Denied)  Sitting - Peak SPV 2 deg/sec within normal limits  Supine - Peak SPV 4 deg/sec within normal limits  Head Right - Peak SPV 6 deg/sec abnormal findings   Head Left - Peak SPV -4 deg/sec within normal limits  Body Right - Peak SPV -8 deg/sec abnormal findings     Bithermal Calorics  Right Cool - Peak SPV 4 deg/sec within normal limits  Right Warm - Peak SPV -6 deg/sec within normal limits    Left Cool - Peak SPV -4 deg/sec within normal limits  Left Warm - Peak SPV 8 deg/sec within normal limits    TotR - 10 deg/sec  TotL - 12 deg/sec    Unilateral weakness - 9% in the Right  Baseline shift - 0 deg/sec  Gain Asymmetry - 9% to the Left    Impressions: The following tests had an ABNORMAL result:  Positional testing (head right, body right)      Plan:   Refer to ENT.     Mo Powers   Doctor of Audiology

## 2022-06-28 ENCOUNTER — TELEPHONE (OUTPATIENT)
Dept: ENT CLINIC | Age: 52
End: 2022-06-28

## 2022-06-28 NOTE — TELEPHONE ENCOUNTER
Called pt discussed VNG results    He will update us after bariatric surgery  Discussed options of neurology referral vs referral to [facility] which he will consider     Mia Boast, MD   [facility]  [address]  [facility]  Office Phone: [phone]

## 2022-07-05 ENCOUNTER — OFFICE VISIT (OUTPATIENT)
Dept: FAMILY MEDICINE CLINIC | Age: 52
End: 2022-07-05
Payer: COMMERCIAL

## 2022-07-05 VITALS
RESPIRATION RATE: 16 BRPM | BODY MASS INDEX: 40.43 KG/M2 | TEMPERATURE: 96.6 F | DIASTOLIC BLOOD PRESSURE: 78 MMHG | WEIGHT: 315 LBS | SYSTOLIC BLOOD PRESSURE: 128 MMHG | OXYGEN SATURATION: 98 % | HEIGHT: 74 IN | HEART RATE: 100 BPM

## 2022-07-05 DIAGNOSIS — Z09 HOSPITAL DISCHARGE FOLLOW-UP: ICD-10-CM

## 2022-07-05 DIAGNOSIS — Z90.3 S/P GASTRIC SLEEVE PROCEDURE: ICD-10-CM

## 2022-07-05 DIAGNOSIS — I10 ESSENTIAL HYPERTENSION: Primary | ICD-10-CM

## 2022-07-05 DIAGNOSIS — E66.01 MORBID OBESITY (HCC): ICD-10-CM

## 2022-07-05 DIAGNOSIS — E11.65 TYPE 2 DIABETES MELLITUS WITH HYPERGLYCEMIA, WITHOUT LONG-TERM CURRENT USE OF INSULIN (HCC): ICD-10-CM

## 2022-07-05 LAB — GLUCOSE POC: 143 MG/DL

## 2022-07-05 PROCEDURE — 82962 GLUCOSE BLOOD TEST: CPT | Performed by: NURSE PRACTITIONER

## 2022-07-05 PROCEDURE — 3051F HG A1C>EQUAL 7.0%<8.0%: CPT | Performed by: NURSE PRACTITIONER

## 2022-07-05 PROCEDURE — 99214 OFFICE O/P EST MOD 30 MIN: CPT | Performed by: NURSE PRACTITIONER

## 2022-07-05 RX ORDER — ONDANSETRON 4 MG/1
TABLET, ORALLY DISINTEGRATING ORAL
COMMUNITY
Start: 2022-07-01 | End: 2022-08-16

## 2022-07-05 RX ORDER — DOCUSATE SODIUM 100 MG/1
CAPSULE, LIQUID FILLED ORAL
COMMUNITY
Start: 2022-06-02 | End: 2022-08-16

## 2022-07-05 RX ORDER — URSODIOL 250 MG/1
TABLET, FILM COATED ORAL
COMMUNITY
Start: 2022-06-02 | End: 2022-08-16

## 2022-07-05 RX ORDER — LANCETS
EACH MISCELLANEOUS
Qty: 100 EACH | Refills: 3 | Status: SHIPPED | OUTPATIENT
Start: 2022-07-05

## 2022-07-05 NOTE — PROGRESS NOTES
Subjective  Chief Complaint   Patient presents with    Follow-up     had surgery last week, 6-     HPI:  Tamie Vyas is a 47 y.o. male. This patient is here for hospital follow up. Discharge summary not available for review today. Hospital information received solely from patient. Admission dates: 6/29-6/30/2022 at [facility]     Admission diagnosis: morbid obesity, HTN, T2DM    Treatment: gastric sleeve 6/29/2022    Specialist follow up appointments: PCP today, surgeon 7/11/2022    Update: Since discharge patient denies any fevers, shortness of breath, lower extremity edema, diarrhea, and constipation. Medication lists reconciled, home meds have been resumed with no questions. Lisinopril, Trulicity, metformin, aspirin, Jardiance, and cyclobenzaprine discontinued at discharge. Does not check home BP or glucose readings. Nonfasting for today's visit. Tolerating bariatric liquid diet. Denies pain.      Past Medical History:   Diagnosis Date    COVID-19 virus infection 06/2022    Diabetes ([address]     Hypercholesterolemia     Hypertension      Family History   Problem Relation Age of Onset    Diabetes Mother     Hypertension Mother     Breast Cancer Mother     Diabetes Maternal Grandmother     Breast Cancer Maternal Grandmother     Diabetes Paternal Grandfather      Social History     Socioeconomic History    Marital status:      Spouse name: Not on file    Number of children: Not on file    Years of education: Not on file    Highest education level: Not on file   Occupational History    Not on file   Tobacco Use    Smoking status: Never Smoker    Smokeless tobacco: Never Used   Vaping Use    Vaping Use: Never used   Substance and Sexual Activity    Alcohol use: Never    Drug use: Never    Sexual activity: Yes   Other Topics Concern    Not on file   Social History Narrative    Not on file     Social Determinants of Health     Financial Resource Strain:     Difficulty of Paying Living Expenses: Not on file   Food Insecurity:     Worried About Running Out of Food in the Last Year: Not on file    Ran Out of Food in the Last Year: Not on file   Transportation Needs:     Lack of Transportation (Medical): Not on file    Lack of Transportation (Non-Medical): Not on file   Physical Activity:     Days of Exercise per Week: Not on file    Minutes of Exercise per Session: Not on file   Stress:     Feeling of Stress : Not on file   Social Connections:     Frequency of Communication with Friends and Family: Not on file    Frequency of Social Gatherings with Friends and Family: Not on file    Attends Holiness Services: Not on file    Active Member of [address] [place] [address] [employer] or Organizations: Not on file    Attends Club or Organization Meetings: Not on file    Marital Status: Not on file   Intimate Partner Violence:     Fear of Current or Ex-Partner: Not on file    Emotionally Abused: Not on file    Physically Abused: Not on file    Sexually Abused: Not on file   Housing Stability:     Unable to Pay for Housing in the Last Year: Not on file    Number of [place] in the Last Year: Not on file    Unstable Housing in the Last Year: Not on file     Current Outpatient Medications on File Prior to Visit   Medication Sig Dispense Refill    rosuvastatin (CRESTOR) 10 mg tablet TAKE 1 TABLET BY MOUTH EVERY DAY AT NIGHT 90 Tablet 3    amLODIPine (NORVASC) 5 mg tablet TAKE 1 TABLET BY MOUTH EVERY DAY 90 Tablet 0    cholecalciferol (VITAMIN D3) (2,000 UNITS /50 MCG) cap capsule Take 1 Capsule by mouth daily. 90 Capsule 3    multivitamin (ONE A DAY) tablet Take 1 Tab by mouth daily.  omega 3-dha-epa-fish oil (Fish Oil) 100-160-1,000 mg cap Take  by mouth.  1 tablet po daily      OneTouch UltraSoft Lancets misc       OneTouch Verio test strips strip       docusate sodium (COLACE) 100 mg capsule TAKE 1 CAPSULE BY MOUTH EVERY DAY AS NEEDED FOR 30 DAYS      ondansetron (ZOFRAN ODT) 4 mg disintegrating tablet       ursodioL (ACTIGALL) 250 mg tablet TAKE 1 TABLET BY MOUTH TWICE A DAY WITH FOOD FOR 90 DAYS      [DISCONTINUED] Jardiance 25 mg tablet TAKE 1 TABLET BY MOUTH EVERY DAY (Patient not taking: Reported on 7/5/2022) 90 Tablet 0    [DISCONTINUED] metFORMIN ER (GLUCOPHAGE XR) 500 mg tablet TAKE 2 TABLETS BY MOUTH TWICE A DAY WITH MEALS (Patient not taking: Reported on 7/5/2022) 360 Tablet 0    [DISCONTINUED] lisinopriL (PRINIVIL, ZESTRIL) 20 mg tablet TAKE 1 TABLET BY MOUTH EVERY DAY (Patient not taking: Reported on 7/5/2022) 90 Tablet 1    [DISCONTINUED] dulaglutide (Trulicity) 4.5 SN/8.0 mL pnij 4.5 mg by SubCUTAneous route every seven (7) days. (Patient not taking: Reported on 7/5/2022) 6 Each 1    [DISCONTINUED] BABY ASPIRIN PO Take  by mouth. 1 tablet po daily (Patient not taking: Reported on 7/5/2022)       No current facility-administered medications on file prior to visit. No Known Allergies  ROS  See HPI for pertinent ROS. Objective  Visit Vitals  /78 (BP 1 Location: Right upper arm, BP Patient Position: Sitting)   Pulse 100   Temp (!) 96.6 °F (35.9 °C) (Temporal)   Resp 16   Ht 6' 2\" (1.88 m)   Wt (!) 350 lb 4 oz (158.9 kg)   SpO2 98%   BMI 44.97 kg/m²       Physical Exam  Vitals and nursing note reviewed. Constitutional:       General: He is not in acute distress. Appearance: Normal appearance. He is morbidly obese. HENT:      Head: Normocephalic. Eyes:      Extraocular Movements: Extraocular movements intact. Cardiovascular:      Rate and Rhythm: Normal rate and regular rhythm. Heart sounds: Normal heart sounds. Pulmonary:      Effort: Pulmonary effort is normal.      Breath sounds: Normal breath sounds. Musculoskeletal:         General: Normal range of motion. Right lower leg: No edema. Left lower leg: No edema. Skin:     General: Skin is warm and dry.    Neurological:      Mental Status: He is alert and oriented to person, place, and time. Psychiatric:         Mood and Affect: Mood normal.         Behavior: Behavior normal.          Assessment & Plan      ICD-10-CM ICD-9-CM    1. Essential hypertension  I10 401.9    2. Type 2 diabetes mellitus with hyperglycemia, without long-term current use of insulin (HCC)  E11.65 250.00 AMB POC GLUCOSE BLOOD, BY GLUCOSE MONITORING DEVICE     790.29 lancets misc      glucose blood VI test strips (ASCENSIA AUTODISC VI, ONE TOUCH ULTRA TEST VI) strip   3. Morbid obesity ([address]  E66.01 278.01    4. S/P gastric sleeve procedure  Z90.3 V45.75    5. Hospital discharge follow-up  Z09 V67.59      Diagnoses and all orders for this visit:    1. Essential hypertension  BP is below goal in the office today. Discussed the importance of close BP monitoring with weight loss. Check BP readings 1-2 times daily and 1 to 2 hours after taking medication and after sitting quietly for 5 minutes with both feet flat on the floor and arm at heart level. Reviewed s/s of hypotension. Call if readings are consistently greater than 140/90 or less than 110/60 on either number. Increase regular exercise, limit salt intake, and stay well-hydrated. 2. Type 2 diabetes mellitus with hyperglycemia, without long-term current use of insulin (HCC)  Postprandial glucose is below expected today. Ok to discontinue diabetic medication with close glucose monitoring. Check fasting glucose 3-4 times per week with goal <130. [id number] Nelia Dyson if it is running a little higher. Will check A1c at f/u in 6 weeks. -     AMB POC GLUCOSE BLOOD, BY GLUCOSE MONITORING DEVICE  -     lancets misc; Use daily and prn.  -     glucose blood VI test strips (ASCENSIA AUTODISC VI, ONE TOUCH ULTRA TEST VI) strip; Use daily and prn. Results for orders placed or performed in visit on 07/05/22   AMB POC GLUCOSE BLOOD, BY GLUCOSE MONITORING DEVICE   Result Value Ref Range    Glucose  MG/DL       3. Morbid obesity ([facility] [address]  Weight is down 30+ pounds since his last visit. Attributed to pre surgery intentional weight loss and recent bariatric surgery. His goal is to discontinue all medication over the next several months. 4. S/P gastric sleeve procedure  Doing well post op. Has follow up appointment scheduled with bariatric surgeon. Lap sites open to air, well approximated with surgical glue, and no s/s of infection. [facility] discharge follow-up      Follow-up and Dispositions    · Return if symptoms worsen or fail to improve, for as schedule 8/16/2022.            Milind Aguilar, NP

## 2022-07-05 NOTE — PROGRESS NOTES
Chief Complaint   Patient presents with    Follow-up     had surgery last week, 6-   1. \"Have you been to the ER, urgent care clinic since your last visit? Hospitalized since your last visit? \" Yes Reason for visit: [place], gastric sleeve, 2022    2. \"Have you seen or consulted any other health care providers outside of the [address] [place] [address] since your last visit? \" No     3. For patients aged 39-70: Has the patient had a colonoscopy / FIT/ Cologuard? Yes - no Care Gap present      If the patient is female:    4. For patients aged 41-77: Has the patient had a mammogram within the past 2 years? NA - based on age or sex      11. For patients aged 21-65: Has the patient had a pap smear?  NA - based on age or sex  Visit Vitals  /78 (BP 1 Location: Right upper arm, BP Patient Position: Sitting)   Pulse 100   Temp (!) 96.6 °F (35.9 °C) (Temporal)   Resp 16   Ht 6' 2\" (1.88 m)   Wt (!) 350 lb 4 oz (158.9 kg)   SpO2 98%   BMI 44.97 kg/m²     3 most recent PHQ Screens 7/5/2022   Little interest or pleasure in doing things Not at all   Feeling down, depressed, irritable, or hopeless Not at all   Total Score PHQ 2 0

## 2022-07-14 ENCOUNTER — PATIENT MESSAGE (OUTPATIENT)
Dept: FAMILY MEDICINE CLINIC | Age: 52
End: 2022-07-14

## 2022-07-14 DIAGNOSIS — E11.65 TYPE 2 DIABETES MELLITUS WITH HYPERGLYCEMIA, WITHOUT LONG-TERM CURRENT USE OF INSULIN (HCC): Primary | ICD-10-CM

## 2022-07-14 DIAGNOSIS — I10 ESSENTIAL HYPERTENSION: ICD-10-CM

## 2022-07-15 RX ORDER — AMLODIPINE BESYLATE 5 MG/1
TABLET ORAL
Qty: 90 TABLET | Refills: 0 | Status: SHIPPED | OUTPATIENT
Start: 2022-07-15

## 2022-07-15 NOTE — TELEPHONE ENCOUNTER
From: Alix Sandra  To: Ashely Cortes NP  Sent: 7/14/2022 11:10 PM EDT  Subject: Glucose Monitor    Douglas Molina,  I can't seem to find my Glucose Monitor. We bought a house about 8 months ago and it must have gotten misplaced during the move. Can you put in a script for a new Glucose Monitor? I prefer One Touch if specifying a specific kind is possible.   Thanks,  Chani Zhang

## 2022-07-17 RX ORDER — INSULIN PUMP SYRINGE, 3 ML
EACH MISCELLANEOUS
Qty: 1 KIT | Refills: 0 | Status: SHIPPED | OUTPATIENT
Start: 2022-07-17

## 2022-07-17 RX ORDER — LANCETS
EACH MISCELLANEOUS
Qty: 100 EACH | Refills: 3 | Status: SHIPPED | OUTPATIENT
Start: 2022-07-17

## 2022-08-16 ENCOUNTER — OFFICE VISIT (OUTPATIENT)
Dept: FAMILY MEDICINE CLINIC | Age: 52
End: 2022-08-16
Payer: COMMERCIAL

## 2022-08-16 VITALS
RESPIRATION RATE: 16 BRPM | SYSTOLIC BLOOD PRESSURE: 132 MMHG | HEIGHT: 74 IN | WEIGHT: 315 LBS | DIASTOLIC BLOOD PRESSURE: 84 MMHG | BODY MASS INDEX: 40.43 KG/M2 | OXYGEN SATURATION: 100 % | HEART RATE: 64 BPM | TEMPERATURE: 97.2 F

## 2022-08-16 DIAGNOSIS — E11.65 TYPE 2 DIABETES MELLITUS WITH HYPERGLYCEMIA, WITHOUT LONG-TERM CURRENT USE OF INSULIN (HCC): ICD-10-CM

## 2022-08-16 DIAGNOSIS — I10 ESSENTIAL HYPERTENSION: Primary | ICD-10-CM

## 2022-08-16 DIAGNOSIS — Z90.3 H/O GASTRIC SLEEVE: ICD-10-CM

## 2022-08-16 DIAGNOSIS — E66.01 MORBID OBESITY (HCC): ICD-10-CM

## 2022-08-16 DIAGNOSIS — E78.2 MIXED HYPERLIPIDEMIA: ICD-10-CM

## 2022-08-16 PROCEDURE — 3051F HG A1C>EQUAL 7.0%<8.0%: CPT | Performed by: NURSE PRACTITIONER

## 2022-08-16 PROCEDURE — 99214 OFFICE O/P EST MOD 30 MIN: CPT | Performed by: NURSE PRACTITIONER

## 2022-08-16 NOTE — PATIENT INSTRUCTIONS
Blood pressure goal less than 140/90 on both numbers. Call if BP is consistently less than 110/70 on either number.

## 2022-08-16 NOTE — PROGRESS NOTES
Chief Complaint   Patient presents with    Follow Up Chronic Condition    1. \"Have you been to the ER, urgent care clinic since your last visit? Hospitalized since your last visit? \" No    2. \"Have you seen or consulted any other health care providers outside of the [address] [place] [address] since your last visit? \" No     3. For patients aged 39-70: Has the patient had a colonoscopy / FIT/ Cologuard? Yes - no Care Gap present      If the patient is female:    4. For patients aged 41-77: Has the patient had a mammogram within the past 2 years? NA - based on age or sex      11. For patients aged 21-65: Has the patient had a pap smear?  NA - based on age or sex Visit Vitals  /84 (BP 1 Location: Right upper arm, BP Patient Position: Sitting, BP Cuff Size: Large adult)   Pulse 64   Temp 97.2 °F (36.2 °C) (Temporal)   Resp 16   Ht 6' 2\" (1.88 m)   Wt 330 lb 4 oz (149.8 kg)   SpO2 100%   BMI 42.40 kg/m²      3 most recent PHQ Screens 8/16/2022   Little interest or pleasure in doing things Not at all   Feeling down, depressed, irritable, or hopeless Not at all   Total Score PHQ 2 0

## 2022-08-16 NOTE — PROGRESS NOTES
Subjective  Chief Complaint   Patient presents with    Follow Up Chronic Condition     HPI:  Linda Sigala is a 47 y.o. male. Patient presents for management of HTN, HLD, and T2DM. Management of HTN-  Current meds: Amlodipine 5mg daily  Home BP readings: does not check   Denies lightheadedness, dizziness, headaches, chest pain, palpitations, and lower extremity edema.     Management of T2DM-  Current meds: diet controlled, meds discontinued following gastric sleeve surgery  Compliant with medication: N/A  Last A1c:   Lab Results   Component Value Date/Time    Hemoglobin A1c 7.6 (H) 04/08/2022 08:13 AM    Hemoglobin A1c, External 8.7 11/11/2019 12:00 AM      Home glucose monitoring range: monitor reviewed, fasting consistently 150-130, postprandial 130-140s  Hypoglycemia: Denies  Increased thirst, appetite, and urination: Denies  Vision changes, last eye exam: Denies, one year ago, scheduled 9/2022  Checks feet regularly, numbness and injury: Yes, denies  Last foot exam: 12/2021  Compliant with statin: Rosuvastatin  Compliant with ACEI: Microalbumin annually  Last microalbumin:   Lab Results   Component Value Date/Time    Microalb/Creat ratio (ug/mg creat.) 5 12/09/2021 10:40 AM     Pneumovax: 2019  Compliant with diet: Yes  Complaint with exercise: Walking  Personal h/o CV disease including MI, stroke, and PVD: Denies      Past Medical History:   Diagnosis Date    COVID-19 virus infection 06/2022    Diabetes ([address]     Hypercholesterolemia     Hypertension      Family History   Problem Relation Age of Onset    Diabetes Mother     Hypertension Mother     Breast Cancer Mother     Diabetes Maternal Grandmother     Breast Cancer Maternal Grandmother     Diabetes Paternal Grandfather      Social History     Socioeconomic History    Marital status:      Spouse name: Not on file    Number of children: Not on file    Years of education: Not on file    Highest education level: Not on file   Occupational History Not on file   Tobacco Use    Smoking status: Never    Smokeless tobacco: Never   Vaping Use    Vaping Use: Never used   Substance and Sexual Activity    Alcohol use: Never    Drug use: Never    Sexual activity: Yes   Other Topics Concern    Not on file   Social History Narrative    Not on file     Social Determinants of Health     Financial Resource Strain: Low Risk     Difficulty of Paying Living Expenses: Not hard at all   Food Insecurity: No Food Insecurity    Worried About Running Out of Food in the Last Year: Never true    Ran Out of Food in the Last Year: Never true   Transportation Needs: Not on file   Physical Activity: Not on file   Stress: Not on file   Social Connections: Not on file   Intimate Partner Violence: Not on file   Housing Stability: Not on file     Current Outpatient Medications on File Prior to Visit   Medication Sig Dispense Refill    Blood-Glucose Meter monitoring kit Check glucose once daily and prn. 1 Kit 0    lancets misc Check glucose once daily and prn. 100 Each 3    amLODIPine (NORVASC) 5 mg tablet TAKE 1 TABLET BY MOUTH EVERY DAY 90 Tablet 0    lancets misc Use daily and prn. 100 Each 3    glucose blood VI test strips (ASCENSIA AUTODISC VI, ONE TOUCH ULTRA TEST VI) strip Use daily and prn. 100 Strip 3    rosuvastatin (CRESTOR) 10 mg tablet TAKE 1 TABLET BY MOUTH EVERY DAY AT NIGHT 90 Tablet 3    cholecalciferol (VITAMIN D3) (2,000 UNITS /50 MCG) cap capsule Take 1 Capsule by mouth daily. 90 Capsule 3    multivitamin (ONE A DAY) tablet Take 1 Tab by mouth daily. omega 3-dha-epa-fish oil (Fish Oil) 100-160-1,000 mg cap Take  by mouth. 1 tablet po daily      OneTouch UltraSoft Lancets misc       OneTouch Verio test strips strip       [DISCONTINUED] Multivitamins with Fluoride (MULTI-VITAMIN PO) 1 Tablet. [DISCONTINUED] glucose blood VI test strips (ASCENSIA AUTODISC VI, ONE TOUCH ULTRA TEST VI) strip Check glucose daily and prn.  100 Strip 3    [DISCONTINUED] docusate sodium (COLACE) 100 mg capsule TAKE 1 CAPSULE BY MOUTH EVERY DAY AS NEEDED FOR 30 DAYS (Patient not taking: Reported on 8/16/2022)      [DISCONTINUED] ondansetron (ZOFRAN ODT) 4 mg disintegrating tablet  (Patient not taking: Reported on 8/16/2022)      [DISCONTINUED] ursodioL (ACTIGALL) 250 mg tablet TAKE 1 TABLET BY MOUTH TWICE A DAY WITH FOOD FOR 90 DAYS (Patient not taking: Reported on 8/16/2022)       No current facility-administered medications on file prior to visit. No Known Allergies      Objective  Visit Vitals  /84 (BP 1 Location: Right upper arm, BP Patient Position: Sitting, BP Cuff Size: Large adult)   Pulse 64   Temp 97.2 °F (36.2 °C) (Temporal)   Resp 16   Ht 6' 2\" (1.88 m)   Wt 330 lb 4 oz (149.8 kg)   SpO2 100%   BMI 42.40 kg/m²       Physical Exam  Vitals and nursing note reviewed. Constitutional:       General: He is not in acute distress. Appearance: Normal appearance. He is morbidly obese. HENT:      Head: Normocephalic. Eyes:      Extraocular Movements: Extraocular movements intact. Cardiovascular:      Rate and Rhythm: Normal rate and regular rhythm. Heart sounds: Normal heart sounds. Pulmonary:      Effort: Pulmonary effort is normal.      Breath sounds: Normal breath sounds. Musculoskeletal:         General: Normal range of motion. Right lower leg: No edema. Left lower leg: No edema. Skin:     General: Skin is warm and dry. Neurological:      Mental Status: He is alert and oriented to person, place, and time. Psychiatric:         Mood and Affect: Mood normal.         Behavior: Behavior normal.        Assessment & Plan      ICD-10-CM ICD-9-CM    1. Essential hypertension  P04 932.9 METABOLIC PANEL, BASIC      2. Mixed hyperlipidemia  E78.2 272.2 LIPID PANEL      3. Type 2 diabetes mellitus with hyperglycemia, without long-term current use of insulin ([facility])  E11.65 250.00 HEMOGLOBIN A1C WITH EAG     790.29       4.  Morbid obesity ([facility] [address]  E66.01 278.01 5. H/O gastric sleeve  Z90.3 V15.29         Diagnoses and all orders for this visit:    1. Essential hypertension  BP is below goal in the office today. Reinforced the importance of home BP monitoring especially with weight loss and positive lifestyle changes. Reviewed signs and symptoms of hypotension and encouraged to call if BP is consistently greater than 140/90 or less than 110/70 on either number.  -     METABOLIC PANEL, BASIC    2. Mixed hyperlipidemia  Rechecking lipids today following weight loss. -     LIPID PANEL    3. Type 2 diabetes mellitus with hyperglycemia, without long-term current use of insulin ([address]  Home fasting and postprandial glucose readings are below goal off medication. Checking A1c today which may be reflective of glucose prior to surgery. If A1c is borderline or slightly elevated, we will hold off on restarting medication until follow-up in 4 months. Continue to limit sugar/carb intake and work on increasing regular exercise.  -     HEMOGLOBIN A1C WITH EAG    4. Morbid obesity ([address]  Weight is down approximately 50 pounds since February. Gastric sleeve surgery completed 6/29/2022.    5. H/O gastric sleeve  As above. Aspects of this note may have been generated using voice recognition software. Despite editing, there may be some syntax errors. Follow-up and Dispositions    Return in about 4 months (around 12/16/2022) for follow up, hypertension, T2DM, nonfasting. I have discussed the diagnosis with the patient and the intended plan as seen in the above orders. The patient has received an after-visit summary and questions were answered concerning future plans. I have discussed medication side effects and warnings with the patient as well.     Paula Hamilton NP

## 2022-08-17 LAB
BUN SERPL-MCNC: 14 MG/DL (ref 6–24)
BUN/CREAT SERPL: 22 (ref 9–20)
CALCIUM SERPL-MCNC: 9.6 MG/DL (ref 8.7–10.2)
CHLORIDE SERPL-SCNC: 107 MMOL/L (ref 96–106)
CHOLEST SERPL-MCNC: 97 MG/DL (ref 100–199)
CO2 SERPL-SCNC: 19 MMOL/L (ref 20–29)
CREAT SERPL-MCNC: 0.64 MG/DL (ref 0.76–1.27)
EGFR: 115 ML/MIN/1.73
EST. AVERAGE GLUCOSE BLD GHB EST-MCNC: 128 MG/DL
GLUCOSE SERPL-MCNC: 117 MG/DL (ref 65–99)
HBA1C MFR BLD: 6.1 % (ref 4.8–5.6)
HDLC SERPL-MCNC: 33 MG/DL
LDLC SERPL CALC-MCNC: 44 MG/DL (ref 0–99)
POTASSIUM SERPL-SCNC: 4 MMOL/L (ref 3.5–5.2)
SODIUM SERPL-SCNC: 146 MMOL/L (ref 134–144)
TRIGL SERPL-MCNC: 106 MG/DL (ref 0–149)
VLDLC SERPL CALC-MCNC: 20 MG/DL (ref 5–40)

## 2022-09-28 ENCOUNTER — OFFICE VISIT (OUTPATIENT)
Dept: FAMILY MEDICINE CLINIC | Age: 52
End: 2022-09-28
Payer: COMMERCIAL

## 2022-09-28 ENCOUNTER — TELEPHONE (OUTPATIENT)
Dept: FAMILY MEDICINE CLINIC | Age: 52
End: 2022-09-28

## 2022-09-28 VITALS
WEIGHT: 302.25 LBS | TEMPERATURE: 97.6 F | DIASTOLIC BLOOD PRESSURE: 90 MMHG | HEIGHT: 74 IN | RESPIRATION RATE: 16 BRPM | SYSTOLIC BLOOD PRESSURE: 152 MMHG | HEART RATE: 78 BPM | OXYGEN SATURATION: 98 % | BODY MASS INDEX: 38.79 KG/M2

## 2022-09-28 DIAGNOSIS — Z28.20 VACCINE REFUSED BY PATIENT: ICD-10-CM

## 2022-09-28 DIAGNOSIS — R20.8 FEELING OF WARMNESS: ICD-10-CM

## 2022-09-28 DIAGNOSIS — I10 ESSENTIAL HYPERTENSION: ICD-10-CM

## 2022-09-28 DIAGNOSIS — Z23 ENCOUNTER FOR IMMUNIZATION: ICD-10-CM

## 2022-09-28 DIAGNOSIS — E86.0 DEHYDRATION: ICD-10-CM

## 2022-09-28 DIAGNOSIS — R42 INTERMITTENT LIGHTHEADEDNESS: Primary | ICD-10-CM

## 2022-09-28 PROCEDURE — 90686 IIV4 VACC NO PRSV 0.5 ML IM: CPT | Performed by: NURSE PRACTITIONER

## 2022-09-28 PROCEDURE — 99214 OFFICE O/P EST MOD 30 MIN: CPT | Performed by: NURSE PRACTITIONER

## 2022-09-28 PROCEDURE — 90471 IMMUNIZATION ADMIN: CPT | Performed by: NURSE PRACTITIONER

## 2022-09-28 RX ORDER — ASCORBIC ACID 250 MG
TABLET ORAL
COMMUNITY

## 2022-09-28 RX ORDER — URSODIOL 250 MG/1
TABLET, FILM COATED ORAL
COMMUNITY
Start: 2022-09-27

## 2022-09-28 NOTE — TELEPHONE ENCOUNTER
Pt would like to know if an IV would be appropriate , he said his wife is concerned he is dehydrated. I advised him we dont do those here at the office but that I would send a message .

## 2022-09-28 NOTE — PROGRESS NOTES
Subjective  Chief Complaint   Patient presents with    Other     Lightheaded several times over the past few weeks     HPI:  Syed Watters is a 47 y.o. male. Patient presents with complaint of lightheaded episodes. This has happened 3 times over the past month. Each time this occurred while attending a concert, inside and outside, and standing during concert. Episodes occurred while feeling overheated, dehydrated, and following 1-2 beers. Sitting and drinking water improved his symptoms. He has attended approx 20 concerts over the past 6 months. Does not check home BP readings. /82 at bariatric surgeons appointment last week. Evaluation to date: none  Aggravating factors: dehydration, overheated, alcohol intake, standing  Reliving factors: rest and water  Treatment to date: rest and water  Relevant PMH: No pertinent additional PMH.     Past Medical History:   Diagnosis Date    COVID-19 virus infection 06/2022    Diabetes ([facility] [address]     Hypercholesterolemia     Hypertension     Type 2 diabetes mellitus with hyperglycemia, without long-term current use of insulin ([facility] [address]      Family History   Problem Relation Age of Onset    Diabetes Mother     Hypertension Mother     Breast Cancer Mother     Diabetes Maternal Grandmother     Breast Cancer Maternal Grandmother     Diabetes Paternal Grandfather      Social History     Socioeconomic History    Marital status:      Spouse name: Not on file    Number of children: Not on file    Years of education: Not on file    Highest education level: Not on file   Occupational History    Not on file   Tobacco Use    Smoking status: Never    Smokeless tobacco: Never   Vaping Use    Vaping Use: Never used   Substance and Sexual Activity    Alcohol use: Never    Drug use: Never    Sexual activity: Yes   Other Topics Concern    Not on file   Social History Narrative    Not on file     Social Determinants of Health     Financial Resource Strain: Low Risk     Difficulty of Paying Living Expenses: Not hard at all   Food Insecurity: No Food Insecurity    Worried About Running Out of Food in the Last Year: Never true    Ran Out of Food in the Last Year: Never true   Transportation Needs: Not on file   Physical Activity: Not on file   Stress: Not on file   Social Connections: Not on file   Intimate Partner Violence: Not on file   Housing Stability: Not on file     Current Outpatient Medications on File Prior to Visit   Medication Sig Dispense Refill    ursodioL (ACTIGALL) 250 mg tablet       ascorbic acid, vitamin C, (Vitamin C) 250 mg tablet Take  by mouth. Blood-Glucose Meter monitoring kit Check glucose once daily and prn. 1 Kit 0    lancets misc Check glucose once daily and prn. 100 Each 3    amLODIPine (NORVASC) 5 mg tablet TAKE 1 TABLET BY MOUTH EVERY DAY 90 Tablet 0    lancets misc Use daily and prn. 100 Each 3    glucose blood VI test strips (ASCENSIA AUTODISC VI, ONE TOUCH ULTRA TEST VI) strip Use daily and prn. 100 Strip 3    cholecalciferol (VITAMIN D3) (2,000 UNITS /50 MCG) cap capsule Take 1 Capsule by mouth daily. 90 Capsule 3    multivitamin (ONE A DAY) tablet Take 1 Tab by mouth daily. omega 3-dha-epa-fish oil (Fish Oil) 100-160-1,000 mg cap Take  by mouth. 1 tablet po daily      OneTouch UltraSoft Lancets misc       OneTouch Verio test strips strip       rosuvastatin (CRESTOR) 10 mg tablet TAKE 1 TABLET BY MOUTH EVERY DAY AT NIGHT 90 Tablet 3     No current facility-administered medications on file prior to visit. No Known Allergies      Objective  Visit Vitals  BP (!) 152/90   Pulse 78   Temp 97.6 °F (36.4 °C) (Temporal)   Resp 16   Ht 6' 2\" (1.88 m)   Wt 302 lb 4 oz (137.1 kg)   SpO2 98%   BMI 38.81 kg/m²       Physical Exam  Vitals and nursing note reviewed. Constitutional:       General: He is not in acute distress. Appearance: Normal appearance. He is obese. HENT:      Head: Normocephalic.    Eyes:      Extraocular Movements: Extraocular movements intact. Cardiovascular:      Rate and Rhythm: Normal rate and regular rhythm. Heart sounds: Normal heart sounds. Pulmonary:      Effort: Pulmonary effort is normal.      Breath sounds: Normal breath sounds. Musculoskeletal:         General: Normal range of motion. Right lower leg: No edema. Left lower leg: No edema. Skin:     General: Skin is warm and dry. Neurological:      Mental Status: He is alert and oriented to person, place, and time. Psychiatric:         Mood and Affect: Mood normal.         Behavior: Behavior normal.        Assessment & Plan      ICD-10-CM ICD-9-CM    1. Intermittent lightheadedness  R42 780.4       2. Dehydration  E86.0 276.51       3. Feeling of warmness  R20.8 782.9       4. Essential hypertension  I10 401.9       5. Encounter for immunization  Z23 V03.89 INFLUENZA, FLUARIX, FLULAVAL, FLUZONE (AGE 6 MO+), AFLURIA(AGE 3Y+) IM, PF, 0.5 ML      6. Vaccine refused by patient  Z28.20 V64.09         Diagnoses and all orders for this visit:    1. Intermittent lightheadedness  I have suspect his symptoms are combination of not drinking enough water throughout the day compounded by alcohol intake in a crowded and warm environment. We discussed the importance of prehydrating before concerts and after each alcoholic beverage to prevent symptoms. Also encouraged to dress in layers to prevent overheating at concerts. Be sure to avoid hunger which may contribute to symptoms as well. 2. Dehydration    3. Feeling of warmness    4. Essential hypertension  Blood pressure is above goal in the office today on 2 readings. Reinforced the importance of home BP monitoring. Check BP readings 1 to 2 hours after taking medication and after sitting quietly for 5 minutes with both feet flat on the floor and arm at heart level. Call if readings are consistently greater than 140/90 on either number.       5. Encounter for immunization  -     INFLUENZA, [address], [address] (AGE 6 MO+), AFLURIA(AGE 3Y+) IM, PF, 0.5 ML    6. Vaccine refused by patient  Declines flu vaccines. Aspects of this note may have been generated using voice recognition software. Despite editing, there may be some syntax errors. I have discussed the diagnosis with the patient and the intended plan as seen in the above orders. The patient has received an after-visit summary and questions were answered concerning future plans. I have discussed medication side effects and warnings with the patient as well.     Delaney Ellison, NP

## 2022-09-28 NOTE — PROGRESS NOTES
Chief Complaint   Patient presents with    Other     Lightheaded several times over the past few weeks, pains in head, cough at  times, negative covid test    1. \"Have you been to the ER, urgent care clinic since your last visit? Hospitalized since your last visit? \" No    2. \"Have you seen or consulted any other health care providers outside of the [address] [place] [address] since your last visit? \" No     3. For patients aged 39-70: Has the patient had a colonoscopy / FIT/ Cologuard? Yes - no Care Gap present      If the patient is female:    4. For patients aged 41-77: Has the patient had a mammogram within the past 2 years? NA - based on age or sex      11. For patients aged 21-65: Has the patient had a pap smear?  NA - based on age or sex Visit Vitals  BP (!) 158/98 (BP 1 Location: Left upper arm, BP Patient Position: Sitting, BP Cuff Size: Large adult)   Pulse 78   Temp 97.6 °F (36.4 °C) (Temporal)   Resp 16   Ht 6' 2\" (1.88 m)   Wt 302 lb 4 oz (137.1 kg)   SpO2 98%   BMI 38.81 kg/m²      3 most recent PHQ Screens 9/28/2022   Little interest or pleasure in doing things Not at all   Feeling down, depressed, irritable, or hopeless Not at all   Total Score PHQ 2 0

## 2022-10-24 ENCOUNTER — TELEPHONE (OUTPATIENT)
Dept: ENT CLINIC | Age: 52
End: 2022-10-24

## 2022-10-24 DIAGNOSIS — R26.89 IMBALANCE: Primary | ICD-10-CM

## 2022-10-24 NOTE — TELEPHONE ENCOUNTER
Pt called stating he would like to talk to Dr. Home Mejia about the next step in his treatment plan, he stated that he and Dr. Home Mejia did discuss him being referred out to another ENT near [place] at one of his visits with her and would like to know if she can send that referral out

## 2022-10-25 ENCOUNTER — TELEPHONE (OUTPATIENT)
Dept: FAMILY MEDICINE CLINIC | Age: 52
End: 2022-10-25

## 2022-10-25 NOTE — TELEPHONE ENCOUNTER
Pt had surgery back in June and has been loosing a lot of weight , he has concerns with his blood sugar as it has been running 150-170s fasting . No appt available for pt to come in an discuss this . Any suggestions ?

## 2022-12-17 DIAGNOSIS — I10 ESSENTIAL HYPERTENSION: ICD-10-CM

## 2022-12-19 RX ORDER — AMLODIPINE BESYLATE 5 MG/1
TABLET ORAL
Qty: 90 TABLET | Refills: 0 | Status: SHIPPED | OUTPATIENT
Start: 2022-12-19

## 2023-02-24 ENCOUNTER — VIRTUAL VISIT (OUTPATIENT)
Dept: FAMILY MEDICINE CLINIC | Age: 53
End: 2023-02-24
Payer: COMMERCIAL

## 2023-02-24 DIAGNOSIS — I10 ESSENTIAL HYPERTENSION: Primary | ICD-10-CM

## 2023-02-24 DIAGNOSIS — E66.09 CLASS 1 OBESITY DUE TO EXCESS CALORIES WITH SERIOUS COMORBIDITY AND BODY MASS INDEX (BMI) OF 34.0 TO 34.9 IN ADULT: ICD-10-CM

## 2023-02-24 DIAGNOSIS — R73.01 IMPAIRED FASTING GLUCOSE: ICD-10-CM

## 2023-02-24 DIAGNOSIS — E78.2 MIXED HYPERLIPIDEMIA: ICD-10-CM

## 2023-02-24 RX ORDER — CALCIUM CARBONATE 500(1250)
1 TABLET ORAL 2 TIMES DAILY
COMMUNITY

## 2023-02-24 RX ORDER — LANOLIN ALCOHOL/MO/W.PET/CERES
325 CREAM (GRAM) TOPICAL
COMMUNITY

## 2023-02-24 NOTE — PROGRESS NOTES
Chief Complaint   Patient presents with    Follow Up Chronic Condition     1. \"Have you been to the ER, urgent care clinic since your last visit? Hospitalized since your last visit? \" No    2. \"Have you seen or consulted any other health care providers outside of the [address] [place] [address] since your last visit? \" No     3. For patients aged 39-70: Has the patient had a colonoscopy / FIT/ Cologuard? Yes - no Care Gap present      If the patient is female:    4. For patients aged 41-77: Has the patient had a mammogram within the past 2 years? NA - based on age or sex      11. For patients aged 21-65: Has the patient had a pap smear? NA - based on age or sex    3 most recent PHQ Screens 2/24/2023   Little interest or pleasure in doing things Not at all   Feeling down, depressed, irritable, or hopeless Not at all   Total Score PHQ 2 0     PLEASE SEND LINK TO [email or web address]

## 2023-02-24 NOTE — PROGRESS NOTES
Consent: Candace Nichole, who was seen by synchronous (real-time) audio-video technology, and/or his healthcare decision maker, is aware that this patient-initiated, Telehealth encounter on 2/24/2023 is a billable service, with coverage as determined by his insurance carrier. He is aware that he may receive a bill and has provided verbal consent to proceed: YES-Consent obtained within past 12 months        712  Subjective:   Candace Nichole is a 47 y.o. male who was seen for Follow Up Chronic Condition  Patient presents for management of hypertension, hyperlipidemia, prediabetes, and obesity. Does not monitor home glucose readings. Weight is down to 265 on home scale following bariatric surgery. He has accepted a new position in [place] and will relocating in July. Management of HTN-  Current meds: Amlodipine 5 mg daily  Home BP readings: does not monitor, needs a new cuff  Denies lightheadedness, dizziness, headaches, chest pain, palpitations, and lower extremity edema. Prior to Admission medications    Medication Sig Start Date End Date Taking? Authorizing Provider   calcium carbonate (OS-CESILIA) 500 mg calcium (1,250 mg) tablet Take 1 Tablet by mouth two (2) times a day. Yes Provider, Historical   ferrous sulfate (Iron) 325 mg (65 mg iron) tablet Take 325 mg by mouth Daily (before breakfast). Yes Provider, Historical   amLODIPine (NORVASC) 5 mg tablet TAKE 1 TABLET BY MOUTH EVERY DAY 12/19/22  Yes Efren Diallo NP   ascorbic acid, vitamin C, (VITAMIN C) 250 mg tablet Take  by mouth. Yes Provider, Historical   Blood-Glucose Meter monitoring kit Check glucose once daily and prn. 7/17/22  Yes Efren Diallo NP   lancets misc Use daily and prn. 7/5/22  Yes Efren Diallo NP   glucose blood VI test strips (ASCENSIA AUTODISC VI, ONE TOUCH ULTRA TEST VI) strip Use daily and prn. 7/5/22  Yes Efren Diallo NP   multivitamin (ONE A DAY) tablet Take 1 Tab by mouth daily.    Yes Provider, Historical omega 3-dha-epa-fish oil (Fish Oil) 100-160-1,000 mg cap Take  by mouth. 1 tablet po daily   Yes Provider, Historical   OneTouch UltraSoft Lancets misc  5/13/20  Yes Provider, Historical   OneTouch Verio test strips strip  5/13/20  Yes Provider, Historical   ursodioL (ACTIGALL) 250 mg tablet  9/27/22 2/24/23  Provider, Historical   lancets misc Check glucose once daily and prn. 7/17/22 2/24/23  Sheila Pope NP   cholecalciferol (VITAMIN D3) (2,000 UNITS /50 MCG) cap capsule Take 1 Capsule by mouth daily. Patient not taking: Reported on 2/24/2023 10/11/21 2/24/23  Sheila Pope NP     No Known Allergies  Patient Active Problem List    Diagnosis    Erectile dysfunction    Impaired fasting glucose    H/O gastric sleeve    Low HDL (under 40)    Hyperlipidemia    Essential hypertension    Vitamin D deficiency    Obesity         Objective:   Vital Signs: (As obtained by patient/caregiver at home)  There were no vitals taken for this visit.      [INSTRUCTIONS:  \"[x]\" Indicates a positive item  \"[]\" Indicates a negative item  -- DELETE ALL ITEMS NOT EXAMINED]    Constitutional: [x] Appears well-developed and well-nourished [x] No apparent distress      [] Abnormal -     Mental status: [x] Alert and awake  [x] Oriented to person/place/time [x] Able to follow commands    [] Abnormal -     Eyes:   EOM    [x]  Normal    [] Abnormal -   Sclera  [x]  Normal    [] Abnormal -          Discharge [x]  None visible   [] Abnormal -     HENT: [x] Normocephalic, atraumatic  [] Abnormal -   [x] Mouth/Throat: Mucous membranes are moist    External Ears [x] Normal  [] Abnormal -    Neck: [x] No visualized mass [] Abnormal -     Pulmonary/Chest: [x] Respiratory effort normal   [x] No visualized signs of difficulty breathing or respiratory distress        [] Abnormal -        Neurological:        [x] No Facial Asymmetry (Cranial nerve 7 motor function) (limited exam due to video visit)          [x] No gaze palsy        [] Abnormal -          Skin:        [x] No significant exanthematous lesions or discoloration noted on facial skin         [] Abnormal -            Psychiatric:       [x] Normal Affect [] Abnormal -        [x] No Hallucinations    Other pertinent observable physical exam findings:-              Assessment & Plan:   Diagnoses and all orders for this visit:    1. Essential hypertension  Reinforced the importance of home BP monitoring especially with significant weight loss. Check home BP readings regularly and call if consistently greater than 140/90 or less than 110/60 on either number. Reviewed signs and symptoms of hypotension and advised to call for any concerns as well. -     METABOLIC PANEL, BASIC    2. Impaired fasting glucose  Due to have A1c rechecked off daily medication.  -     HEMOGLOBIN A1C WITH EAG    3. Mixed hyperlipidemia  Due to have lipids rechecked off daily medication.  -     HEMOGLOBIN A1C WITH EAG  -     LIPID PANEL    4. Class 1 obesity due to excess calories with serious comorbidity and body mass index (BMI) of 34.0 to 34.9 in adult  Praised for ongoing weight loss following gastric sleeve surgery which will have a positive impact on chronic conditions and overall health. We discussed the expected course, resolution and complications of the diagnosis(es) in detail. Medication risks, benefits, costs, interactions, and alternatives were discussed as indicated. I advised him to contact the office if his condition worsens, changes or fails to improve as anticipated. He expressed understanding with the diagnosis(es) and plan. Linwood Du is a 47 y.o. male being evaluated by a video visit encounter for concerns as above. A caregiver was present when appropriate.  Due to this being a TeleHealth encounter (During [place] [address] public health emergency), evaluation of the following organ systems was limited: Vitals/Constitutional/EENT/Resp/CV/GI//MS/Neuro/Skin/Heme-Lymph-Imm. Pursuant to the emergency declaration under the [address] [place] [address], [facility] waiver authority and the [employer] Act, this Virtual  Visit was conducted, with patient's (and/or legal guardian's) consent, to reduce the patient's risk of exposure to COVID-19 and provide necessary medical care. Services were provided through a video synchronous discussion virtually to substitute for in-person clinic visit. Patient and provider were located at their individual homes.         Negro June NP

## 2023-03-17 DIAGNOSIS — I10 ESSENTIAL HYPERTENSION: ICD-10-CM

## 2023-03-17 RX ORDER — AMLODIPINE BESYLATE 5 MG/1
TABLET ORAL
Qty: 90 TABLET | Refills: 0 | Status: SHIPPED | OUTPATIENT
Start: 2023-03-17

## 2023-05-19 RX ORDER — ASCORBIC ACID 250 MG
TABLET ORAL
COMMUNITY

## 2023-05-19 RX ORDER — AMLODIPINE BESYLATE 5 MG/1
1 TABLET ORAL DAILY
COMMUNITY
Start: 2023-03-17 | End: 2023-06-14 | Stop reason: SDUPTHER

## 2023-05-19 RX ORDER — LANCETS 30 GAUGE
EACH MISCELLANEOUS
COMMUNITY
Start: 2022-07-05

## 2023-05-19 RX ORDER — LANCETS
EACH MISCELLANEOUS
COMMUNITY
Start: 2020-05-13

## 2023-05-19 RX ORDER — IBUPROFEN 200 MG
1 CAPSULE ORAL 2 TIMES DAILY
COMMUNITY

## 2023-05-19 RX ORDER — FERROUS SULFATE 325(65) MG
325 TABLET ORAL
COMMUNITY

## 2023-06-15 DIAGNOSIS — I10 ESSENTIAL (PRIMARY) HYPERTENSION: ICD-10-CM

## 2023-06-15 RX ORDER — AMLODIPINE BESYLATE 5 MG/1
TABLET ORAL
Qty: 90 TABLET | Refills: 0 | OUTPATIENT
Start: 2023-06-15